# Patient Record
Sex: FEMALE | Race: BLACK OR AFRICAN AMERICAN | NOT HISPANIC OR LATINO | Employment: UNEMPLOYED | URBAN - METROPOLITAN AREA
[De-identification: names, ages, dates, MRNs, and addresses within clinical notes are randomized per-mention and may not be internally consistent; named-entity substitution may affect disease eponyms.]

---

## 2024-02-04 ENCOUNTER — HOSPITAL ENCOUNTER (EMERGENCY)
Facility: HOSPITAL | Age: 39
Discharge: HOME | End: 2024-02-05
Attending: EMERGENCY MEDICINE
Payer: COMMERCIAL

## 2024-02-04 DIAGNOSIS — R45.851 SUICIDAL IDEATION: ICD-10-CM

## 2024-02-04 DIAGNOSIS — F14.922: Primary | ICD-10-CM

## 2024-02-04 PROCEDURE — 99285 EMERGENCY DEPT VISIT HI MDM: CPT | Performed by: EMERGENCY MEDICINE

## 2024-02-04 PROCEDURE — 96374 THER/PROPH/DIAG INJ IV PUSH: CPT

## 2024-02-04 PROCEDURE — 81025 URINE PREGNANCY TEST: CPT

## 2024-02-04 PROCEDURE — 93010 ELECTROCARDIOGRAM REPORT: CPT | Performed by: EMERGENCY MEDICINE

## 2024-02-04 PROCEDURE — 99284 EMERGENCY DEPT VISIT MOD MDM: CPT | Mod: 25,27

## 2024-02-04 RX ORDER — ONDANSETRON HYDROCHLORIDE 2 MG/ML
4 INJECTION, SOLUTION INTRAVENOUS ONCE
Status: COMPLETED | OUTPATIENT
Start: 2024-02-05 | End: 2024-02-05

## 2024-02-04 RX ORDER — LORAZEPAM 0.5 MG/1
1 TABLET ORAL ONCE AS NEEDED
Status: COMPLETED | OUTPATIENT
Start: 2024-02-04 | End: 2024-02-05

## 2024-02-04 RX ORDER — LORAZEPAM 2 MG/ML
2 INJECTION INTRAMUSCULAR ONCE AS NEEDED
Status: DISCONTINUED | OUTPATIENT
Start: 2024-02-04 | End: 2024-02-05

## 2024-02-04 ASSESSMENT — COLUMBIA-SUICIDE SEVERITY RATING SCALE - C-SSRS
6. HAVE YOU EVER DONE ANYTHING, STARTED TO DO ANYTHING, OR PREPARED TO DO ANYTHING TO END YOUR LIFE?: YES
1. IN THE PAST MONTH, HAVE YOU WISHED YOU WERE DEAD OR WISHED YOU COULD GO TO SLEEP AND NOT WAKE UP?: NO
6. HAVE YOU EVER DONE ANYTHING, STARTED TO DO ANYTHING, OR PREPARED TO DO ANYTHING TO END YOUR LIFE?: NO
2. HAVE YOU ACTUALLY HAD ANY THOUGHTS OF KILLING YOURSELF?: NO

## 2024-02-05 ENCOUNTER — HOSPITAL ENCOUNTER (EMERGENCY)
Facility: HOSPITAL | Age: 39
Discharge: HOME | End: 2024-02-06
Attending: EMERGENCY MEDICINE
Payer: COMMERCIAL

## 2024-02-05 ENCOUNTER — CLINICAL SUPPORT (OUTPATIENT)
Dept: EMERGENCY MEDICINE | Facility: HOSPITAL | Age: 39
End: 2024-02-05
Payer: COMMERCIAL

## 2024-02-05 ENCOUNTER — APPOINTMENT (OUTPATIENT)
Dept: RADIOLOGY | Facility: HOSPITAL | Age: 39
End: 2024-02-05
Payer: COMMERCIAL

## 2024-02-05 VITALS
SYSTOLIC BLOOD PRESSURE: 113 MMHG | HEART RATE: 70 BPM | TEMPERATURE: 97.3 F | WEIGHT: 168 LBS | BODY MASS INDEX: 24.88 KG/M2 | DIASTOLIC BLOOD PRESSURE: 74 MMHG | OXYGEN SATURATION: 99 % | HEIGHT: 69 IN | RESPIRATION RATE: 18 BRPM

## 2024-02-05 DIAGNOSIS — R45.851 SUICIDAL IDEATION: Primary | ICD-10-CM

## 2024-02-05 LAB
ALBUMIN SERPL BCP-MCNC: 4.3 G/DL (ref 3.4–5)
ALP SERPL-CCNC: 69 U/L (ref 33–110)
ALT SERPL W P-5'-P-CCNC: 13 U/L (ref 7–45)
AMPHETAMINES UR QL SCN: ABNORMAL
ANION GAP SERPL CALC-SCNC: 14 MMOL/L (ref 10–20)
APAP SERPL-MCNC: <10 UG/ML
APPEARANCE UR: CLEAR
AST SERPL W P-5'-P-CCNC: 20 U/L (ref 9–39)
ATRIAL RATE: 69 BPM
BARBITURATES UR QL SCN: ABNORMAL
BENZODIAZ UR QL SCN: ABNORMAL
BILIRUB SERPL-MCNC: 0.6 MG/DL (ref 0–1.2)
BILIRUB UR STRIP.AUTO-MCNC: NEGATIVE MG/DL
BUN SERPL-MCNC: 6 MG/DL (ref 6–23)
BZE UR QL SCN: ABNORMAL
CALCIUM SERPL-MCNC: 9.8 MG/DL (ref 8.6–10.6)
CANNABINOIDS UR QL SCN: ABNORMAL
CARDIAC TROPONIN I PNL SERPL HS: 3 NG/L (ref 0–34)
CHLORIDE SERPL-SCNC: 101 MMOL/L (ref 98–107)
CO2 SERPL-SCNC: 21 MMOL/L (ref 21–32)
COLOR UR: YELLOW
CREAT SERPL-MCNC: 0.56 MG/DL (ref 0.5–1.05)
EGFRCR SERPLBLD CKD-EPI 2021: >90 ML/MIN/1.73M*2
ETHANOL SERPL-MCNC: 11 MG/DL
FENTANYL+NORFENTANYL UR QL SCN: ABNORMAL
FLUAV RNA RESP QL NAA+PROBE: NOT DETECTED
FLUBV RNA RESP QL NAA+PROBE: NOT DETECTED
GLUCOSE SERPL-MCNC: 83 MG/DL (ref 74–99)
GLUCOSE UR STRIP.AUTO-MCNC: NEGATIVE MG/DL
HOLD SPECIMEN: NORMAL
KETONES UR STRIP.AUTO-MCNC: NEGATIVE MG/DL
LEUKOCYTE ESTERASE UR QL STRIP.AUTO: NEGATIVE
LITHIUM SERPL-SCNC: 0.28 MMOL/L (ref 0.6–1.2)
NITRITE UR QL STRIP.AUTO: NEGATIVE
OPIATES UR QL SCN: ABNORMAL
OXYCODONE+OXYMORPHONE UR QL SCN: ABNORMAL
P AXIS: 73 DEGREES
P OFFSET: 202 MS
P ONSET: 147 MS
PCP UR QL SCN: ABNORMAL
PH UR STRIP.AUTO: 5 [PH]
POTASSIUM SERPL-SCNC: 4.2 MMOL/L (ref 3.5–5.3)
PR INTERVAL: 154 MS
PREGNANCY TEST URINE, POC: NEGATIVE
PROT SERPL-MCNC: 7.8 G/DL (ref 6.4–8.2)
PROT UR STRIP.AUTO-MCNC: NEGATIVE MG/DL
Q ONSET: 224 MS
QRS COUNT: 11 BEATS
QRS DURATION: 76 MS
QT INTERVAL: 400 MS
QTC CALCULATION(BAZETT): 428 MS
QTC FREDERICIA: 419 MS
R AXIS: 67 DEGREES
RBC # UR STRIP.AUTO: NEGATIVE /UL
SALICYLATES SERPL-MCNC: <3 MG/DL
SARS-COV-2 RNA RESP QL NAA+PROBE: NOT DETECTED
SODIUM SERPL-SCNC: 132 MMOL/L (ref 136–145)
SP GR UR STRIP.AUTO: 1.01
T AXIS: 58 DEGREES
T OFFSET: 424 MS
UROBILINOGEN UR STRIP.AUTO-MCNC: <2 MG/DL
VENTRICULAR RATE: 69 BPM

## 2024-02-05 PROCEDURE — 80053 COMPREHEN METABOLIC PANEL: CPT

## 2024-02-05 PROCEDURE — 80178 ASSAY OF LITHIUM: CPT

## 2024-02-05 PROCEDURE — 71045 X-RAY EXAM CHEST 1 VIEW: CPT | Performed by: STUDENT IN AN ORGANIZED HEALTH CARE EDUCATION/TRAINING PROGRAM

## 2024-02-05 PROCEDURE — 84484 ASSAY OF TROPONIN QUANT: CPT

## 2024-02-05 PROCEDURE — 87636 SARSCOV2 & INF A&B AMP PRB: CPT

## 2024-02-05 PROCEDURE — 36415 COLL VENOUS BLD VENIPUNCTURE: CPT

## 2024-02-05 PROCEDURE — 93005 ELECTROCARDIOGRAM TRACING: CPT

## 2024-02-05 PROCEDURE — 99285 EMERGENCY DEPT VISIT HI MDM: CPT | Mod: 25

## 2024-02-05 PROCEDURE — 2500000001 HC RX 250 WO HCPCS SELF ADMINISTERED DRUGS (ALT 637 FOR MEDICARE OP)

## 2024-02-05 PROCEDURE — 96374 THER/PROPH/DIAG INJ IV PUSH: CPT

## 2024-02-05 PROCEDURE — 71045 X-RAY EXAM CHEST 1 VIEW: CPT

## 2024-02-05 PROCEDURE — 80143 DRUG ASSAY ACETAMINOPHEN: CPT

## 2024-02-05 PROCEDURE — 2500000004 HC RX 250 GENERAL PHARMACY W/ HCPCS (ALT 636 FOR OP/ED)

## 2024-02-05 PROCEDURE — 80307 DRUG TEST PRSMV CHEM ANLYZR: CPT

## 2024-02-05 PROCEDURE — 81003 URINALYSIS AUTO W/O SCOPE: CPT | Mod: 59

## 2024-02-05 RX ORDER — MIDAZOLAM HYDROCHLORIDE 1 MG/ML
2 INJECTION INTRAMUSCULAR; INTRAVENOUS ONCE
Status: DISCONTINUED | OUTPATIENT
Start: 2024-02-05 | End: 2024-02-05 | Stop reason: HOSPADM

## 2024-02-05 RX ADMIN — LORAZEPAM 1 MG: 0.5 TABLET ORAL at 01:19

## 2024-02-05 RX ADMIN — ONDANSETRON 4 MG: 2 INJECTION INTRAMUSCULAR; INTRAVENOUS at 00:44

## 2024-02-05 SDOH — HEALTH STABILITY: MENTAL HEALTH: CONTENT: PREOCCUPATION

## 2024-02-05 SDOH — HEALTH STABILITY: MENTAL HEALTH: BEHAVIORS/MOOD: ANXIOUS;HALLUCINATIONS;TEARFUL

## 2024-02-05 SDOH — HEALTH STABILITY: MENTAL HEALTH: HALLUCINATION: VISUAL

## 2024-02-05 SDOH — HEALTH STABILITY: MENTAL HEALTH: NEEDS EXPRESSED: OTHER (COMMENT)

## 2024-02-05 SDOH — SOCIAL STABILITY: SOCIAL NETWORK: EMOTIONAL SUPPORT GIVEN: REASSURE

## 2024-02-05 ASSESSMENT — COLUMBIA-SUICIDE SEVERITY RATING SCALE - C-SSRS
4. HAVE YOU HAD THESE THOUGHTS AND HAD SOME INTENTION OF ACTING ON THEM?: YES
5. HAVE YOU STARTED TO WORK OUT OR WORKED OUT THE DETAILS OF HOW TO KILL YOURSELF? DO YOU INTEND TO CARRY OUT THIS PLAN?: NO
6. HAVE YOU EVER DONE ANYTHING, STARTED TO DO ANYTHING, OR PREPARED TO DO ANYTHING TO END YOUR LIFE?: NO
2. HAVE YOU ACTUALLY HAD ANY THOUGHTS OF KILLING YOURSELF?: YES
1. IN THE PAST MONTH, HAVE YOU WISHED YOU WERE DEAD OR WISHED YOU COULD GO TO SLEEP AND NOT WAKE UP?: YES
6. HAVE YOU EVER DONE ANYTHING, STARTED TO DO ANYTHING, OR PREPARED TO DO ANYTHING TO END YOUR LIFE?: YES

## 2024-02-05 ASSESSMENT — PAIN DESCRIPTION - PROGRESSION: CLINICAL_PROGRESSION: NOT CHANGED

## 2024-02-05 ASSESSMENT — PAIN SCALES - GENERAL: PAINLEVEL_OUTOF10: 0 - NO PAIN

## 2024-02-05 ASSESSMENT — PAIN - FUNCTIONAL ASSESSMENT: PAIN_FUNCTIONAL_ASSESSMENT: 0-10

## 2024-02-05 NOTE — PROGRESS NOTES
Helena Odonnell is a 38 y.o. female on day 0 of admission presenting with No Principal Problem: There is no principal problem currently on the Problem List. Please update the Problem List and refresh..      Assessment/Plan   Active Problems:  There are no active Hospital Problems.  SW consulted by magda Durán. She stated Pt seen and cleared by EPAT. Pt unable to utilize services from [facility] at this time. Luis Armando asked for support with connection to resources for food due to food insecurity.  SW met bedside with Pt. List provided SW composed for her zip code on resources for a warm meal, pantry, and mobile pantry in her area. Pt recognized [employer] stating she used them last week. SW explained how to use the list as Pt had difficulty reading how it was formatted. Pt to discharge home and is open to a ride to do so. She needs her belongings, however, to get dressed to discharge. Messaged RN for these items.   SW accompanied Pt to ride via roundtrip dispatch.  SERG Valdez

## 2024-02-05 NOTE — ED PROVIDER NOTES
HPI   Chief Complaint   Patient presents with    Hallucinations       38-year-old female past medical history of bipolar disorder as well as polysubstance use most recently cocaine and marijuana who presents today for hallucinations and suicidal ideation.  Patient states that she has previously been admitted to the hospital for psychiatric workup, was started on lithium quetiapine and Prozac.  She states that 2 days ago she took all of her lithium in an attempt to harm herself.  She states that since then she has had nausea vomiting diarrhea abdominal pain, and has had urinary frequency and dysuria as well.  She also endorses chest pain and shortness of breath.  She does also endorse cocaine use just prior to arrival.  She endorses suicidal ideation with active plan to overdose on her medications, similar to her previous attempt earlier this week.  She denies any fevers headache changes vision changes in hearing.  She denies any sore throat or cough.  She would like to be placed as she does not feel safe at home.                          Rosio Coma Scale Score: 14                  Patient History   No past medical history on file.  No past surgical history on file.  No family history on file.  Social History     Tobacco Use    Smoking status: Not on file    Smokeless tobacco: Not on file   Substance Use Topics    Alcohol use: Not on file    Drug use: Not on file       Physical Exam   ED Triage Vitals [02/04/24 2354]   Temperature Heart Rate Respirations BP   36.8 °C (98.2 °F) 72 18 (!) 149/98      Pulse Ox Temp Source Heart Rate Source Patient Position   100 % Oral Monitor Sitting      BP Location FiO2 (%)     Left arm 21 %       Physical Exam  Vitals and nursing note reviewed.   Constitutional:       General: She is not in acute distress.     Appearance: Normal appearance. She is not ill-appearing or diaphoretic.   HENT:      Head: Normocephalic and atraumatic.      Mouth/Throat:      Mouth: Mucous membranes are  moist.      Pharynx: No oropharyngeal exudate or posterior oropharyngeal erythema.   Eyes:      General: No scleral icterus.     Extraocular Movements: Extraocular movements intact.      Conjunctiva/sclera: Conjunctivae normal.      Pupils: Pupils are equal, round, and reactive to light.   Cardiovascular:      Rate and Rhythm: Normal rate and regular rhythm.      Pulses: Normal pulses.      Heart sounds: Normal heart sounds. No murmur heard.     No gallop.   Pulmonary:      Effort: Pulmonary effort is normal. No respiratory distress.      Breath sounds: Normal breath sounds. No stridor. No wheezing, rhonchi or rales.   Abdominal:      General: Bowel sounds are normal. There is no distension.      Palpations: Abdomen is soft. There is no mass.      Tenderness: There is no abdominal tenderness.      Hernia: No hernia is present.   Musculoskeletal:         General: No swelling, deformity or signs of injury. Normal range of motion.      Cervical back: Normal range of motion and neck supple. No tenderness.   Skin:     General: Skin is warm.      Capillary Refill: Capillary refill takes less than 2 seconds.      Findings: No erythema, lesion or rash.      Comments: Small abrasions on left wrist, no induration erythema or evidence of necrosis.   Neurological:      General: No focal deficit present.      Mental Status: She is alert. Mental status is at baseline.   Psychiatric:      Comments: Anxious mood, responding to internal stimuli.  Endorses SI with plan, no HI, (+) tactile versus visual hallucinations.         ED Course & MDM   Diagnoses as of 02/05/24 0053   Cocaine intoxication with perceptual disturbance (CMS/HCC)   Suicidal ideation       Medical Decision Making  38-year-old female past medical history of bipolar disorder currently on lithium quetiapine and fluoxetine as well as history of polysubstance use including cocaine who presents today for hallucinations and suicidal ideation.  Patient's tactile versus  visual hallucinations is likely due to cocaine intoxication, but given the fact that she has reported history of taking all of her lithium, we will get labs including a lithium level.  She did also endorse some chest pain and in the setting of cocaine use, is concerning for an adverse effect of cocaine use.  Given this, we will also get a troponin and BNP as well as a chest x-ray and EKG.  If the patient is medically cleared, we will have the EPAT evaluate her for possible placement.  I do anticipate the patient will need inpatient admission given her reported history.  Patient's lithium level was subtherapeutic, which is reassuring given her reported history of overdose.  Patient drug screen did demonstrate cocaine amphetamine PCP and marijuana, which may be the reason why she was having hallucinations and seem to be decompensated psychiatrically.  Given this, we will have EPAT evaluate her when she is sober for possible placement.  If EPAT does not feel that the patient needs placement for psychiatric reason, I do believe that she may benefit from [facility], if she is willing.  On reassessment, the patient is much more calm, but is still endorsing SI with plan.  She does endorse some substance use including PCP yesterday, which she does not prefer to do.  However, the patient still does not feel that she will be safe going home, so we will be EPAT to eval at time of signout.    Amount and/or Complexity of Data Reviewed  Labs: ordered. Decision-making details documented in ED Course.  Radiology: ordered.     Details: Chest x-ray without any acute abnormality, no consolidation pneumothorax or pleural effusion.  No bony abnormality or cardiomegaly.  ECG/medicine tests: ordered.        Procedure  Procedures     Luke Le MD  Resident  02/05/24 9491
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
## 2024-02-06 VITALS
OXYGEN SATURATION: 99 % | HEART RATE: 73 BPM | SYSTOLIC BLOOD PRESSURE: 139 MMHG | DIASTOLIC BLOOD PRESSURE: 78 MMHG | RESPIRATION RATE: 16 BRPM | TEMPERATURE: 97.9 F

## 2024-02-06 LAB
ALBUMIN SERPL BCP-MCNC: 4.5 G/DL (ref 3.4–5)
ALP SERPL-CCNC: 78 U/L (ref 33–110)
ALT SERPL W P-5'-P-CCNC: 14 U/L (ref 7–45)
AMPHETAMINES UR QL SCN: ABNORMAL
ANION GAP SERPL CALC-SCNC: 10 MMOL/L (ref 10–20)
APAP SERPL-MCNC: <10 UG/ML
AST SERPL W P-5'-P-CCNC: 19 U/L (ref 9–39)
B-HCG SERPL-ACNC: <3 MIU/ML
BARBITURATES UR QL SCN: ABNORMAL
BASOPHILS # BLD AUTO: 0.04 X10*3/UL (ref 0–0.1)
BASOPHILS NFR BLD AUTO: 0.4 %
BENZODIAZ UR QL SCN: ABNORMAL
BILIRUB SERPL-MCNC: 0.4 MG/DL (ref 0–1.2)
BUN SERPL-MCNC: 15 MG/DL (ref 6–23)
BZE UR QL SCN: ABNORMAL
CALCIUM SERPL-MCNC: 9.4 MG/DL (ref 8.6–10.6)
CANNABINOIDS UR QL SCN: ABNORMAL
CHLORIDE SERPL-SCNC: 104 MMOL/L (ref 98–107)
CO2 SERPL-SCNC: 26 MMOL/L (ref 21–32)
CREAT SERPL-MCNC: 0.74 MG/DL (ref 0.5–1.05)
EGFRCR SERPLBLD CKD-EPI 2021: >90 ML/MIN/1.73M*2
EOSINOPHIL # BLD AUTO: 0.05 X10*3/UL (ref 0–0.7)
EOSINOPHIL NFR BLD AUTO: 0.5 %
ERYTHROCYTE [DISTWIDTH] IN BLOOD BY AUTOMATED COUNT: 14.4 % (ref 11.5–14.5)
ETHANOL SERPL-MCNC: 39 MG/DL
FENTANYL+NORFENTANYL UR QL SCN: ABNORMAL
FLUAV RNA RESP QL NAA+PROBE: NOT DETECTED
FLUBV RNA RESP QL NAA+PROBE: NOT DETECTED
GLUCOSE SERPL-MCNC: 71 MG/DL (ref 74–99)
HCT VFR BLD AUTO: 36.1 % (ref 36–46)
HGB BLD-MCNC: 12.6 G/DL (ref 12–16)
IMM GRANULOCYTES # BLD AUTO: 0.03 X10*3/UL (ref 0–0.7)
IMM GRANULOCYTES NFR BLD AUTO: 0.3 % (ref 0–0.9)
LYMPHOCYTES # BLD AUTO: 2.1 X10*3/UL (ref 1.2–4.8)
LYMPHOCYTES NFR BLD AUTO: 22.5 %
MCH RBC QN AUTO: 30.8 PG (ref 26–34)
MCHC RBC AUTO-ENTMCNC: 34.9 G/DL (ref 32–36)
MCV RBC AUTO: 88 FL (ref 80–100)
MONOCYTES # BLD AUTO: 0.47 X10*3/UL (ref 0.1–1)
MONOCYTES NFR BLD AUTO: 5 %
NEUTROPHILS # BLD AUTO: 6.66 X10*3/UL (ref 1.2–7.7)
NEUTROPHILS NFR BLD AUTO: 71.3 %
NRBC BLD-RTO: 0 /100 WBCS (ref 0–0)
OPIATES UR QL SCN: ABNORMAL
OXYCODONE+OXYMORPHONE UR QL SCN: ABNORMAL
PCP UR QL SCN: ABNORMAL
PLATELET # BLD AUTO: 272 X10*3/UL (ref 150–450)
POTASSIUM SERPL-SCNC: 3.9 MMOL/L (ref 3.5–5.3)
PREGNANCY TEST URINE, POC: NEGATIVE
PROT SERPL-MCNC: 8.1 G/DL (ref 6.4–8.2)
RBC # BLD AUTO: 4.09 X10*6/UL (ref 4–5.2)
SALICYLATES SERPL-MCNC: <3 MG/DL
SARS-COV-2 RNA RESP QL NAA+PROBE: NOT DETECTED
SODIUM SERPL-SCNC: 136 MMOL/L (ref 136–145)
TSH SERPL-ACNC: 0.69 MIU/L (ref 0.44–3.98)
WBC # BLD AUTO: 9.4 X10*3/UL (ref 4.4–11.3)

## 2024-02-06 PROCEDURE — 99222 1ST HOSP IP/OBS MODERATE 55: CPT

## 2024-02-06 PROCEDURE — 80053 COMPREHEN METABOLIC PANEL: CPT | Performed by: STUDENT IN AN ORGANIZED HEALTH CARE EDUCATION/TRAINING PROGRAM

## 2024-02-06 PROCEDURE — 80143 DRUG ASSAY ACETAMINOPHEN: CPT | Performed by: STUDENT IN AN ORGANIZED HEALTH CARE EDUCATION/TRAINING PROGRAM

## 2024-02-06 PROCEDURE — 84702 CHORIONIC GONADOTROPIN TEST: CPT | Performed by: STUDENT IN AN ORGANIZED HEALTH CARE EDUCATION/TRAINING PROGRAM

## 2024-02-06 PROCEDURE — 80307 DRUG TEST PRSMV CHEM ANLYZR: CPT | Performed by: STUDENT IN AN ORGANIZED HEALTH CARE EDUCATION/TRAINING PROGRAM

## 2024-02-06 PROCEDURE — 85025 COMPLETE CBC W/AUTO DIFF WBC: CPT | Performed by: STUDENT IN AN ORGANIZED HEALTH CARE EDUCATION/TRAINING PROGRAM

## 2024-02-06 PROCEDURE — 84443 ASSAY THYROID STIM HORMONE: CPT | Performed by: STUDENT IN AN ORGANIZED HEALTH CARE EDUCATION/TRAINING PROGRAM

## 2024-02-06 PROCEDURE — 36415 COLL VENOUS BLD VENIPUNCTURE: CPT | Performed by: STUDENT IN AN ORGANIZED HEALTH CARE EDUCATION/TRAINING PROGRAM

## 2024-02-06 PROCEDURE — 81025 URINE PREGNANCY TEST: CPT | Performed by: STUDENT IN AN ORGANIZED HEALTH CARE EDUCATION/TRAINING PROGRAM

## 2024-02-06 PROCEDURE — 87636 SARSCOV2 & INF A&B AMP PRB: CPT | Performed by: STUDENT IN AN ORGANIZED HEALTH CARE EDUCATION/TRAINING PROGRAM

## 2024-02-06 RX ORDER — QUETIAPINE FUMARATE 400 MG/1
400 TABLET, FILM COATED ORAL NIGHTLY
COMMUNITY
Start: 2024-01-23

## 2024-02-06 RX ORDER — FLUOXETINE HYDROCHLORIDE 40 MG/1
40 CAPSULE ORAL DAILY
COMMUNITY
Start: 2024-01-23

## 2024-02-06 RX ORDER — LITHIUM CARBONATE 300 MG/1
600 TABLET, FILM COATED, EXTENDED RELEASE ORAL 2 TIMES DAILY
COMMUNITY
Start: 2024-01-23

## 2024-02-06 SDOH — HEALTH STABILITY: MENTAL HEALTH: HAVE YOU EVER DONE ANYTHING, STARTED TO DO ANYTHING, OR PREPARED TO DO ANYTHING TO END YOUR LIFE?: NO

## 2024-02-06 SDOH — HEALTH STABILITY: MENTAL HEALTH: HAVE YOU ACTUALLY HAD ANY THOUGHTS OF KILLING YOURSELF?: YES

## 2024-02-06 SDOH — HEALTH STABILITY: MENTAL HEALTH: HAVE YOU BEEN THINKING ABOUT HOW YOU MIGHT DO THIS?: NO

## 2024-02-06 SDOH — HEALTH STABILITY: MENTAL HEALTH: SUICIDE ASSESSMENT: ADULT (C-SSRS)

## 2024-02-06 SDOH — HEALTH STABILITY: MENTAL HEALTH: HALLUCINATION: AUDITORY;VISUAL;TACTILE

## 2024-02-06 SDOH — HEALTH STABILITY: MENTAL HEALTH: HAVE YOU HAD THESE THOUGHTS AND HAD SOME INTENTION OF ACTING ON THEM?: NO

## 2024-02-06 SDOH — HEALTH STABILITY: MENTAL HEALTH: BEHAVIORS/MOOD: CRYING;COOPERATIVE

## 2024-02-06 SDOH — HEALTH STABILITY: MENTAL HEALTH
HAVE YOU STARTED TO WORK OUT OR WORKED OUT THE DETAILS OF HOW TO KILL YOURSELF? DO YOU INTENT TO CARRY OUT THIS PLAN?: NO

## 2024-02-06 SDOH — HEALTH STABILITY: MENTAL HEALTH: HAVE YOU WISHED YOU WERE DEAD OR WISHED YOU COULD GO TO SLEEP AND NOT WAKE UP?: YES

## 2024-02-06 NOTE — PROGRESS NOTES
Patient signed to me at 0700.  Patient pending evaluation by emergency psychiatric assessment team.  Patient evaluated by emergency psychiatric assessment team and there are no acute concerns for SI/HI/AVH.  They note that the patient is agreeable and appropriate for drug rehabilitation.  [facility] team engaged and currently working on placement.  Plan at this time is to discharge patient drug rehabilitation.  Patient agreeable with plan agreeable discharge denying SI/HI/AVH and discharged in stable condition.    Patient seen and discussed with tending physician.    Gume Chris M.D.  PGY-3 EM

## 2024-02-06 NOTE — PROGRESS NOTES
"Helena Odonnell is a 38 y.o. female in ED for psychiatric evaluation      Assessment/Plan   Met with Pt bedside. Pt appeared fatigued.  SW discussed with Pt present living arrangement. She stated she is getting bitten by spiders in her home and she has communicated this to the medical team. Pt explained [facility] moved out of her building and [facility] is moving in.  SW spoke with Pt about expressing her concerns to them or contacting the  society.  SW asked Pt if she would like referral to [facility] today or to [facility]. Education provided to Pt on the [facility] per her request. Pt declined to be referral there. SW became agitated when SW explained her role as being there to assist Pt with a safe discharge. Pt began shouting at the SW, \"want me to bust my head on a bridge.\" Pt called SW a \"bitch\" as SW left a room.   GRACE collaborated with ANGELINE Nava who then assessed Pt and Pt agreed to [facility] referral. No further needs from this SWer.    SERG Valdez      "

## 2024-02-06 NOTE — ED PROVIDER NOTES
HPI:  Patient is a 38-year-old female with a history of bipolar disorder, polysubstance use disorder (methamphetamine and cocaine) hypertension who presents with suicidal ideation.  Of note, patient was discharged from here yesterday after being evaluated by EPAT for suicidal ideation.  She states she lives in a apartment building for women and has had frequent spider bites, stating that she no longer wants to live there.  Patient reports a history of suicide attempts in the past but does not specify further.  She reports persistent SI without a plan.  No tactile, visual or auditory hallucinations.  No HI.  No symptomatic complaints including but not limited to itching, rashes, nausea, vomiting, diarrhea, abdominal pain, chest pain or shortness of breath.    ROS: A 10-system ROS was performed and was negative except as documented in the HPI.    PMH/PSH: Reviewed in EMR. As above in HPI.  SH: Denies EtOH, tobacco or illicit drug use.  Allergies:   Allergies   Allergen Reactions    Sulfamethoxazole-Trimethoprim Hives and Other     headache      Medications: See prescription writer for full medication list.     General: no acute distress, appropriate conversation but very tearful  HEENT:  No rhinorrhea. MMM.  Cardiac: regular rate rhythm, no murmurs  Pulm:  normal respiratory effort on room air, equal chest expansion, clear bilaterally, no wheeze or crackles  GI: soft, nontender, nondistended, +BS  Extremities:  moves all extremities freely, no edema noted  Skin: warm, well-perfused, no lesions noted on exposed skin.  No insect bites noted to the arms and legs however there are diffuse facial excoriations noted.  No erythema.  Neuro:  AOx3, moves all 4 extremities freely and independently   Psych: Patient does not appear to be responding to internal stimuli.  Appropriate eye contact.    Assessment/Plan/MDM  Patient is a 38-year-old female with a history of bipolar disorder, polysubstance use disorder (methamphetamine  and cocaine) hypertension who presents with suicidal ideation.  Alcohol level slightly elevated, drug screen notable for cocaine and amphetamines.  COVID/flu swab negative.  Patient is not pregnant.  TSH within normal limits.  There are no gross electrolyte derangements, no leukocytosis or anemia.  Patient signed out in stable condition pending EPAT consultation.    EKG shows normal sinus rhythm, rate 76, normal axis, normal parables, normal QTc, no ST elevation, nonspecific PVCs, low voltage in V3 grossly unchanged from yesterday.    ED Course/Progress:    Diagnoses as of 02/06/24 0657   Suicidal ideation        Clinical Impression: as above  Dispo: Deferred to EPAT/Dr. Chris    Pt seen and discussed with attending physician, Dr. Devon Wynn MD  PGY3, Emergency Medicine    Disclaimer: This note was dictated by speech recognition. An attempt at proof reading was made to minimize errors. Errors in transcription may be present.  Please call if questions.      Joselin yWnn MD  Resident  02/06/24 5657

## 2024-02-06 NOTE — PROGRESS NOTES
Pharmacy Medication History Review    Helena Odonnell is a 38 y.o. female admitted for No Principal Problem: There is no principal problem currently on the Problem List. Please update the Problem List and refresh.. Pharmacy reviewed the patient's [id number] medications and allergies for accuracy.    The list below reflects the updated PTA list. Comments regarding how patient may be taking medications differently can be found in the Admit Orders Activity  Prior to Admission Medications   Prescriptions Last Dose Informant Patient Reported? Taking?   FLUoxetine (PROzac) 40 mg capsule  Self Yes Yes   Sig: Take 1 capsule (40 mg) by mouth once daily.   QUEtiapine (SEROquel) 400 mg tablet  Self Yes Yes   Sig: Take 1 tablet (400 mg) by mouth once daily at bedtime.   lithium ER (Lithobid) 300 mg 12 hr tablet  Self Yes Yes   Sig: Take 2 tablets (600 mg) by mouth 2 times a day.      Facility-Administered Medications: None        The list below reflects the updated allergy list. Please review each documented allergy for additional clarification and justification.  Allergies  Reviewed by Teresita Lundberg PharmD on 2/6/2024        Severity Reactions Comments    Sulfamethoxazole-trimethoprim Not Specified Hives, Other headache            Patient accepts M2B at discharge. Pharmacy has been updated to [facility].    Sources used to complete the med history include   Patient Interview - good historian able to independently state medications names and directions for administration  Admission MedRec Grid - none  OARRS - none  EPIC medication dispense report    Below are additional concerns with the patient's PTA list.  The patient's pharmacy dispense history is consistent with the medications the patient stated to be taking  Recent fill for famotidine 20mg once daily on 1/23/24, but patient denied taking this medication  Gets medications from [facility] only    Teresita Lundberg PharmD   Transitions of Care  Pharmacist   Meds Ambulatory and Retail Services  Please reach out via Secure Chat for questions, or if no response call [employer] or [facility]
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
## 2024-02-07 ENCOUNTER — HOSPITAL ENCOUNTER (EMERGENCY)
Facility: HOSPITAL | Age: 39
Discharge: HOME | End: 2024-02-07
Attending: EMERGENCY MEDICINE
Payer: COMMERCIAL

## 2024-02-07 VITALS
DIASTOLIC BLOOD PRESSURE: 63 MMHG | HEIGHT: 69 IN | WEIGHT: 168 LBS | TEMPERATURE: 97.3 F | BODY MASS INDEX: 24.88 KG/M2 | HEART RATE: 82 BPM | SYSTOLIC BLOOD PRESSURE: 99 MMHG | RESPIRATION RATE: 16 BRPM | OXYGEN SATURATION: 99 %

## 2024-02-07 DIAGNOSIS — F32.A DEPRESSION, UNSPECIFIED DEPRESSION TYPE: Primary | ICD-10-CM

## 2024-02-07 PROCEDURE — 99284 EMERGENCY DEPT VISIT MOD MDM: CPT | Performed by: EMERGENCY MEDICINE

## 2024-02-07 PROCEDURE — 99283 EMERGENCY DEPT VISIT LOW MDM: CPT | Performed by: EMERGENCY MEDICINE

## 2024-02-07 SDOH — HEALTH STABILITY: MENTAL HEALTH: NEEDS EXPRESSED: EMOTIONAL

## 2024-02-07 SDOH — SOCIAL STABILITY: SOCIAL NETWORK: EMOTIONAL SUPPORT GIVEN: REASSURE

## 2024-02-07 SDOH — HEALTH STABILITY: MENTAL HEALTH: BEHAVIORS/MOOD: ANXIOUS;CRYING;SAD;TEARFUL

## 2024-02-07 SDOH — HEALTH STABILITY: MENTAL HEALTH: HAVE YOU EVER DONE ANYTHING, STARTED TO DO ANYTHING, OR PREPARED TO DO ANYTHING TO END YOUR LIFE?: NO

## 2024-02-07 SDOH — HEALTH STABILITY: MENTAL HEALTH: HAVE YOU WISHED YOU WERE DEAD OR WISHED YOU COULD GO TO SLEEP AND NOT WAKE UP?: YES

## 2024-02-07 SDOH — HEALTH STABILITY: MENTAL HEALTH: SUICIDE ASSESSMENT: ADULT (C-SSRS)

## 2024-02-07 SDOH — HEALTH STABILITY: MENTAL HEALTH: HAVE YOU ACTUALLY HAD ANY THOUGHTS OF KILLING YOURSELF?: YES

## 2024-02-07 SDOH — HEALTH STABILITY: MENTAL HEALTH: HAVE YOU HAD THESE THOUGHTS AND HAD SOME INTENTION OF ACTING ON THEM?: NO

## 2024-02-07 SDOH — HEALTH STABILITY: MENTAL HEALTH: HAVE YOU BEEN THINKING ABOUT HOW YOU MIGHT DO THIS?: NO

## 2024-02-07 ASSESSMENT — COLUMBIA-SUICIDE SEVERITY RATING SCALE - C-SSRS
6. HAVE YOU EVER DONE ANYTHING, STARTED TO DO ANYTHING, OR PREPARED TO DO ANYTHING TO END YOUR LIFE?: NO
5. HAVE YOU STARTED TO WORK OUT OR WORKED OUT THE DETAILS OF HOW TO KILL YOURSELF? DO YOU INTEND TO CARRY OUT THIS PLAN?: NO
4. HAVE YOU HAD THESE THOUGHTS AND HAD SOME INTENTION OF ACTING ON THEM?: NO
6. HAVE YOU EVER DONE ANYTHING, STARTED TO DO ANYTHING, OR PREPARED TO DO ANYTHING TO END YOUR LIFE?: NO
1. IN THE PAST MONTH, HAVE YOU WISHED YOU WERE DEAD OR WISHED YOU COULD GO TO SLEEP AND NOT WAKE UP?: NO
2. HAVE YOU ACTUALLY HAD ANY THOUGHTS OF KILLING YOURSELF?: NO

## 2024-02-07 NOTE — ED PROVIDER NOTES
"HPI:  Patient is a 38-year-old female with a history of bipolar disorder, polysubstance use disorder, hypertension who presents with acute on chronic depression.  Patient states she is currently living in an independent women's apartment shelter with spiders.  She reports recurrent spider bites which prompted her to call EMS.  Patient states she is \"tired of being depressed\" but denies active SI or HI without a plan.  She denies tactile, visual or auditory hallucinations.  Of note, patient was discharged from here yesterday after being evaluated by [facility] for suicidal ideation.  She denies symptomatic complaints including but not limited to nausea, vomiting, diarrhea, chest pain or shortness of breath.    ROS: A 10-system ROS was performed and was negative except as documented in the HPI.    PMH/PSH: Reviewed in EMR. As above in HPI.  SH: Denies EtOH, tobacco or illicit drug use.  Allergies:   Allergies   Allergen Reactions    Sulfamethoxazole-Trimethoprim Hives and Other     headache      Medications: See prescription writer for full medication list.     General: no acute distress, appropriate conversation  HEENT:  No rhinorrhea. MMM.  Cardiac: regular rate rhythm, no murmurs  Pulm:  normal respiratory effort on room air, equal chest expansion, clear bilaterally, no wheeze or crackles  GI: soft, nontender, nondistended, +BS  Extremities:  moves all extremities freely, no edema noted  Skin: warm, well-perfused, no lesions noted on exposed skin.  No insect bites noted to the arms, legs or back.  No pustules, erythema, warmth or rash to exposed skin.  Neuro:  AOx3, moves all 4 extremities freely and independently     Assessment/Plan/MDM  Patient is a 38-year-old female with a history of bipolar disorder, polysubstance use disorder, hypertension who presents with acute on chronic depression.  Patient was evaluated by [facility] yesterday in addition to social work.  She was again evaluated 2 days prior.  She denies SI, HI, " tactile, visual or auditory hallucinations.  Patient did not meet inpatient psychiatric hospitalization criteria, as EPAT did not believe she would have any lasting resolution to her chronic social and circumstantial needs.  Patient is declining [facility] today and denies a history of substance use.  On reassessment with [facility] yesterday, they did not recommend any medication management changes.  I discussed this with the patient who acknowledged.  I do not feel that she is a danger to herself or others patient has previously had a social work consult for possible placement in a different but is declining this today.  Provided with food, passed p.o. challenge and ambulatory independently.  Patient provided with a ride back to her apartment shelter and discharged in stable condition.    ED Course/Progress:    Diagnoses as of 02/07/24 0608   Depression, unspecified depression type        Clinical Impression: as above  Dispo:   Home: I discussed the differential, results and discharge plan with the patient.  I emphasized the importance of follow-up with psychiatry as scheduled.  I explained reasons for the patient to return to the Emergency Department.  Questions were addressed.  They understand return precautions and discharge instructions. The patient expressed understanding and agreement with assessment/plan.     Pt seen and discussed with attending physician, Dr. Noe Wynn MD  PGY3, Emergency Medicine    Disclaimer: This note was dictated by speech recognition. An attempt at proof reading was made to minimize errors. Errors in transcription may be present.  Please call if questions.      Joselin Wynn MD  Resident  02/07/24 0611

## 2024-02-07 NOTE — ED TRIAGE NOTES
Patient presents to the ED for psych eval. Patient states she is being bit by spiders. Denies SI/HI

## 2024-02-20 ENCOUNTER — HOSPITAL ENCOUNTER (EMERGENCY)
Facility: HOSPITAL | Age: 39
Discharge: HOME | End: 2024-02-20
Attending: EMERGENCY MEDICINE
Payer: COMMERCIAL

## 2024-02-20 ENCOUNTER — CLINICAL SUPPORT (OUTPATIENT)
Dept: EMERGENCY MEDICINE | Facility: HOSPITAL | Age: 39
End: 2024-02-20
Payer: COMMERCIAL

## 2024-02-20 VITALS
OXYGEN SATURATION: 99 % | BODY MASS INDEX: 25.18 KG/M2 | SYSTOLIC BLOOD PRESSURE: 132 MMHG | HEART RATE: 80 BPM | DIASTOLIC BLOOD PRESSURE: 81 MMHG | HEIGHT: 69 IN | RESPIRATION RATE: 14 BRPM | TEMPERATURE: 98.6 F | WEIGHT: 170 LBS

## 2024-02-20 DIAGNOSIS — R45.851 SUICIDAL IDEATION: Primary | ICD-10-CM

## 2024-02-20 DIAGNOSIS — F14.90 CRACK COCAINE USE: ICD-10-CM

## 2024-02-20 LAB
ALBUMIN SERPL BCP-MCNC: 3.8 G/DL (ref 3.4–5)
ALP SERPL-CCNC: 57 U/L (ref 33–110)
ALT SERPL W P-5'-P-CCNC: 11 U/L (ref 7–45)
AMPHETAMINES UR QL SCN: ABNORMAL
ANION GAP SERPL CALC-SCNC: 13 MMOL/L (ref 10–20)
APAP SERPL-MCNC: <10 UG/ML
AST SERPL W P-5'-P-CCNC: 22 U/L (ref 9–39)
BARBITURATES UR QL SCN: ABNORMAL
BASOPHILS # BLD AUTO: 0.02 X10*3/UL (ref 0–0.1)
BASOPHILS NFR BLD AUTO: 0.3 %
BENZODIAZ UR QL SCN: ABNORMAL
BILIRUB SERPL-MCNC: 0.3 MG/DL (ref 0–1.2)
BUN SERPL-MCNC: 9 MG/DL (ref 6–23)
BZE UR QL SCN: ABNORMAL
CALCIUM SERPL-MCNC: 9.6 MG/DL (ref 8.6–10.6)
CANNABINOIDS UR QL SCN: ABNORMAL
CHLORIDE SERPL-SCNC: 103 MMOL/L (ref 98–107)
CO2 SERPL-SCNC: 28 MMOL/L (ref 21–32)
CREAT SERPL-MCNC: 0.59 MG/DL (ref 0.5–1.05)
EGFRCR SERPLBLD CKD-EPI 2021: >90 ML/MIN/1.73M*2
EOSINOPHIL # BLD AUTO: 0.01 X10*3/UL (ref 0–0.7)
EOSINOPHIL NFR BLD AUTO: 0.1 %
ERYTHROCYTE [DISTWIDTH] IN BLOOD BY AUTOMATED COUNT: 14.2 % (ref 11.5–14.5)
ETHANOL SERPL-MCNC: <10 MG/DL
FENTANYL+NORFENTANYL UR QL SCN: ABNORMAL
GLUCOSE SERPL-MCNC: 72 MG/DL (ref 74–99)
HCT VFR BLD AUTO: 31.8 % (ref 36–46)
HGB BLD-MCNC: 11.3 G/DL (ref 12–16)
IMM GRANULOCYTES # BLD AUTO: 0.01 X10*3/UL (ref 0–0.7)
IMM GRANULOCYTES NFR BLD AUTO: 0.1 % (ref 0–0.9)
LYMPHOCYTES # BLD AUTO: 1.67 X10*3/UL (ref 1.2–4.8)
LYMPHOCYTES NFR BLD AUTO: 23 %
MCH RBC QN AUTO: 31.3 PG (ref 26–34)
MCHC RBC AUTO-ENTMCNC: 35.5 G/DL (ref 32–36)
MCV RBC AUTO: 88 FL (ref 80–100)
MONOCYTES # BLD AUTO: 0.48 X10*3/UL (ref 0.1–1)
MONOCYTES NFR BLD AUTO: 6.6 %
NEUTROPHILS # BLD AUTO: 5.06 X10*3/UL (ref 1.2–7.7)
NEUTROPHILS NFR BLD AUTO: 69.9 %
NRBC BLD-RTO: 0 /100 WBCS (ref 0–0)
OPIATES UR QL SCN: ABNORMAL
OXYCODONE+OXYMORPHONE UR QL SCN: ABNORMAL
PCP UR QL SCN: ABNORMAL
PLATELET # BLD AUTO: 236 X10*3/UL (ref 150–450)
POTASSIUM SERPL-SCNC: 4 MMOL/L (ref 3.5–5.3)
PREGNANCY TEST URINE, POC: NEGATIVE
PROT SERPL-MCNC: 7.1 G/DL (ref 6.4–8.2)
RBC # BLD AUTO: 3.61 X10*6/UL (ref 4–5.2)
SALICYLATES SERPL-MCNC: <3 MG/DL
SARS-COV-2 RNA RESP QL NAA+PROBE: NOT DETECTED
SODIUM SERPL-SCNC: 140 MMOL/L (ref 136–145)
WBC # BLD AUTO: 7.3 X10*3/UL (ref 4.4–11.3)

## 2024-02-20 PROCEDURE — 99285 EMERGENCY DEPT VISIT HI MDM: CPT | Performed by: EMERGENCY MEDICINE

## 2024-02-20 PROCEDURE — 87635 SARS-COV-2 COVID-19 AMP PRB: CPT | Performed by: EMERGENCY MEDICINE

## 2024-02-20 PROCEDURE — 80307 DRUG TEST PRSMV CHEM ANLYZR: CPT | Performed by: STUDENT IN AN ORGANIZED HEALTH CARE EDUCATION/TRAINING PROGRAM

## 2024-02-20 PROCEDURE — 99285 EMERGENCY DEPT VISIT HI MDM: CPT | Mod: 25

## 2024-02-20 PROCEDURE — 80320 DRUG SCREEN QUANTALCOHOLS: CPT | Performed by: STUDENT IN AN ORGANIZED HEALTH CARE EDUCATION/TRAINING PROGRAM

## 2024-02-20 PROCEDURE — 93005 ELECTROCARDIOGRAM TRACING: CPT

## 2024-02-20 PROCEDURE — 85025 COMPLETE CBC W/AUTO DIFF WBC: CPT | Performed by: STUDENT IN AN ORGANIZED HEALTH CARE EDUCATION/TRAINING PROGRAM

## 2024-02-20 PROCEDURE — 93010 ELECTROCARDIOGRAM REPORT: CPT | Performed by: EMERGENCY MEDICINE

## 2024-02-20 PROCEDURE — 99222 1ST HOSP IP/OBS MODERATE 55: CPT

## 2024-02-20 PROCEDURE — 80053 COMPREHEN METABOLIC PANEL: CPT | Performed by: STUDENT IN AN ORGANIZED HEALTH CARE EDUCATION/TRAINING PROGRAM

## 2024-02-20 PROCEDURE — 36415 COLL VENOUS BLD VENIPUNCTURE: CPT | Performed by: STUDENT IN AN ORGANIZED HEALTH CARE EDUCATION/TRAINING PROGRAM

## 2024-02-20 PROCEDURE — 81025 URINE PREGNANCY TEST: CPT | Performed by: STUDENT IN AN ORGANIZED HEALTH CARE EDUCATION/TRAINING PROGRAM

## 2024-02-20 ASSESSMENT — COLUMBIA-SUICIDE SEVERITY RATING SCALE - C-SSRS
6. HAVE YOU EVER DONE ANYTHING, STARTED TO DO ANYTHING, OR PREPARED TO DO ANYTHING TO END YOUR LIFE?: NO
1. IN THE PAST MONTH, HAVE YOU WISHED YOU WERE DEAD OR WISHED YOU COULD GO TO SLEEP AND NOT WAKE UP?: YES
2. HAVE YOU ACTUALLY HAD ANY THOUGHTS OF KILLING YOURSELF?: NO

## 2024-02-20 ASSESSMENT — PAIN - FUNCTIONAL ASSESSMENT: PAIN_FUNCTIONAL_ASSESSMENT: 0-10

## 2024-02-20 ASSESSMENT — PAIN SCALES - GENERAL
PAINLEVEL_OUTOF10: 0 - NO PAIN
PAINLEVEL_OUTOF10: 0 - NO PAIN

## 2024-02-20 NOTE — ED TRIAGE NOTES
Pt seen here yesterday for same. Pt was discharged to home with script. Pt unable to . Is still feeling depressed. Has admitted to alcohol usage today. A/Ox4 w/o cp sobb or n/v. 0/10 pain. Skin warm dry and intact pink membranes. Pt recalls all events and follows all commands. Resp are equal and unlabored. MAEx4 w/o neuro defs noted.

## 2024-02-20 NOTE — ED PROVIDER NOTES
HPI   Chief Complaint   Patient presents with    Suicidal       The patient is a 38-year-old female with past medical history of polysubstance abuse and schizophrenia presenting to ED for suicidal ideation and concerns of polysubstance abuse. The patient reports she feels like she is out of control with her polysubstance abuse, and it is driving her to have thoughts of hurting herself without any specific plan. She does report passive suicidal ideation. She denies any homicidal ideation. No auditory or visual hallucinations.  She did admit to using alcohol and crack cocaine several hours ago.  She also reports that she was seen at outside hospital ED and offered some form of detox which she declined yesterday. She denies any trauma, falls, or injuries. No passing out. She denies any headache, dizziness, vision changes, neck pain, back pain, chest pain, shortness of breath, nausea, vomiting, abdominal pain, diarrhea, urinary symptoms, numbness, tingling, fevers, or chills.                           [place] Coma Scale Score: 15                     Patient History   No past medical history on file.  No past surgical history on file.  No family history on file.  Social History     Tobacco Use    Smoking status: Unknown    Smokeless tobacco: Not on file   Substance Use Topics    Alcohol use: Not on file    Drug use: Not on file       Physical Exam   ED Triage Vitals [02/20/24 0538]   Temperature Heart Rate Respirations BP   36.5 °C (97.7 °F) 96 19 (!) 145/97      Pulse Ox Temp src Heart Rate Source Patient Position   100 % -- -- --      BP Location FiO2 (%)     -- --       Physical Exam  Constitutional:       General: She is not in acute distress.     Appearance: Normal appearance.   HENT:      Head: Normocephalic and atraumatic.      Mouth/Throat:      Mouth: Mucous membranes are moist.   Eyes:      General: No scleral icterus.     Extraocular Movements: Extraocular movements intact.      Pupils: Pupils are equal,  round, and reactive to light.   Cardiovascular:      Rate and Rhythm: Normal rate and regular rhythm.      Heart sounds: Normal heart sounds. No murmur heard.  Pulmonary:      Effort: Pulmonary effort is normal. No respiratory distress.      Breath sounds: Normal breath sounds. No wheezing.   Abdominal:      General: There is no distension.      Palpations: Abdomen is soft.      Tenderness: There is no abdominal tenderness. There is no guarding or rebound.   Musculoskeletal:         General: Normal range of motion.      Cervical back: Normal range of motion and neck supple.      Right lower leg: No edema.      Left lower leg: No edema.   Skin:     General: Skin is warm and dry.   Neurological:      General: No focal deficit present.      Mental Status: She is alert and oriented to person, place, and time.      Cranial Nerves: No cranial nerve deficit.      Sensory: No sensory deficit.      Motor: No weakness.   Psychiatric:      Comments: Reports suicidal ideation but denies any specific plan. Denies homicidal ideation. Denies auditory or visual hallucinations.          ED Course & [facility]   ED Course as of 02/20/24 2146   Tue Feb 20, 2024 0737 EKG interpreted by myself: Normal sinus rhythm, ventricular 68, normal axis, QTc 420 ms, no acute injury pattern noted. [VH]   1255 Briefly, patient is a 38-year-old female with history of polysubstance use (alcohol and crack cocaine) last use 2 hours prior to arrival who presented with suicidal ideation without a plan.  Patient evaluated by [facility], does not meet inpatient psych criteria.  Patient declining [facility] consultation.  Discharged in stable condition with psychiatric resources [EG]      ED Course User Index  [EG] Joselin Wynn MD  [VH] Shyam Randall DO         Diagnoses as of 02/20/24 2146   Suicidal ideation   Crack cocaine use       Medical Decision Making  In the Emergency Department, hospital records were reviewed.  The patient is afebrile with stable vital signs.  The patient is in no respiratory distress, satting well on room air. The patient presents for suicidal ideation with known polysubstance abuse.  She did report alcohol use and crack cocaine use today. She denies any specific plan to hurt herself. She denies any homicidal ideation. She denies any hallucinations. Elopement precautions and suicide precautions were ordered.    Will obtain medical clearance labs and have EPAT evaluate the patient. Labs were sent with the results pending. The patient is signed out to the oncoming resident physician pending labs and pending EPAT evaluation and recommendations for final disposition.  Given that she does have housing insecurity and substance abuse affecting her social determinants of health, she will likely also require social work consultation upon discharge, whether that happens today in the ED or after inpatient psychiatric hospitalization. Patient remains stable at this time.    Discussed with the attending  Shyam Randall DO PGY-4  Emergency Medicine    Patient        Procedure  Procedures     Shyam Randall DO  Resident  02/20/24 3964    I saw and evaluated the patient. I personally obtained the key and critical portions of the history and physical exam or was physically present for key and critical portions performed by the resident/fellow. I reviewed the resident/fellow's documentation and discussed the patient with the resident/fellow. I agree with the resident/fellow's medical decision making as documented in the note.    MD Shannan Tello MD  02/21/24 2023
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
## 2024-03-14 ENCOUNTER — APPOINTMENT (OUTPATIENT)
Dept: RADIOLOGY | Facility: HOSPITAL | Age: 39
End: 2024-03-14
Payer: COMMERCIAL

## 2024-03-14 ENCOUNTER — HOSPITAL ENCOUNTER (EMERGENCY)
Facility: HOSPITAL | Age: 39
Discharge: HOME | End: 2024-03-14
Attending: EMERGENCY MEDICINE
Payer: COMMERCIAL

## 2024-03-14 ENCOUNTER — CLINICAL SUPPORT (OUTPATIENT)
Dept: EMERGENCY MEDICINE | Facility: HOSPITAL | Age: 39
End: 2024-03-14
Payer: COMMERCIAL

## 2024-03-14 VITALS
WEIGHT: 201 LBS | BODY MASS INDEX: 29.77 KG/M2 | HEIGHT: 69 IN | RESPIRATION RATE: 16 BRPM | HEART RATE: 81 BPM | SYSTOLIC BLOOD PRESSURE: 145 MMHG | TEMPERATURE: 97.5 F | DIASTOLIC BLOOD PRESSURE: 84 MMHG | OXYGEN SATURATION: 100 %

## 2024-03-14 DIAGNOSIS — R07.9 CHEST PAIN, UNSPECIFIED TYPE: Primary | ICD-10-CM

## 2024-03-14 LAB
ALBUMIN SERPL BCP-MCNC: 4.2 G/DL (ref 3.4–5)
ALP SERPL-CCNC: 57 U/L (ref 33–110)
ALT SERPL W P-5'-P-CCNC: 14 U/L (ref 7–45)
AMPHETAMINES UR QL SCN: ABNORMAL
ANION GAP SERPL CALC-SCNC: 12 MMOL/L (ref 10–20)
AST SERPL W P-5'-P-CCNC: 21 U/L (ref 9–39)
B-HCG SERPL-ACNC: <3 MIU/ML
BARBITURATES UR QL SCN: ABNORMAL
BASOPHILS # BLD AUTO: 0.03 X10*3/UL (ref 0–0.1)
BASOPHILS NFR BLD AUTO: 0.4 %
BENZODIAZ UR QL SCN: ABNORMAL
BILIRUB SERPL-MCNC: 0.2 MG/DL (ref 0–1.2)
BUN SERPL-MCNC: 10 MG/DL (ref 6–23)
BZE UR QL SCN: ABNORMAL
CALCIUM SERPL-MCNC: 9.2 MG/DL (ref 8.6–10.6)
CANNABINOIDS UR QL SCN: ABNORMAL
CARDIAC TROPONIN I PNL SERPL HS: <3 NG/L (ref 0–34)
CHLORIDE SERPL-SCNC: 99 MMOL/L (ref 98–107)
CO2 SERPL-SCNC: 29 MMOL/L (ref 21–32)
CREAT SERPL-MCNC: 0.59 MG/DL (ref 0.5–1.05)
D DIMER PPP FEU-MCNC: 292 NG/ML FEU
EGFRCR SERPLBLD CKD-EPI 2021: >90 ML/MIN/1.73M*2
EOSINOPHIL # BLD AUTO: 0.01 X10*3/UL (ref 0–0.7)
EOSINOPHIL NFR BLD AUTO: 0.1 %
ERYTHROCYTE [DISTWIDTH] IN BLOOD BY AUTOMATED COUNT: 13.5 % (ref 11.5–14.5)
FENTANYL+NORFENTANYL UR QL SCN: ABNORMAL
FLUAV RNA RESP QL NAA+PROBE: NOT DETECTED
FLUBV RNA RESP QL NAA+PROBE: NOT DETECTED
GLUCOSE SERPL-MCNC: 73 MG/DL (ref 74–99)
HCT VFR BLD AUTO: 33 % (ref 36–46)
HGB BLD-MCNC: 11.1 G/DL (ref 12–16)
IMM GRANULOCYTES # BLD AUTO: 0.02 X10*3/UL (ref 0–0.7)
IMM GRANULOCYTES NFR BLD AUTO: 0.3 % (ref 0–0.9)
LYMPHOCYTES # BLD AUTO: 1.59 X10*3/UL (ref 1.2–4.8)
LYMPHOCYTES NFR BLD AUTO: 21.1 %
MCH RBC QN AUTO: 29.9 PG (ref 26–34)
MCHC RBC AUTO-ENTMCNC: 33.6 G/DL (ref 32–36)
MCV RBC AUTO: 89 FL (ref 80–100)
METHADONE UR QL SCN: ABNORMAL
MONOCYTES # BLD AUTO: 0.52 X10*3/UL (ref 0.1–1)
MONOCYTES NFR BLD AUTO: 6.9 %
NEUTROPHILS # BLD AUTO: 5.35 X10*3/UL (ref 1.2–7.7)
NEUTROPHILS NFR BLD AUTO: 71.2 %
NRBC BLD-RTO: 0 /100 WBCS (ref 0–0)
OPIATES UR QL SCN: ABNORMAL
OXYCODONE+OXYMORPHONE UR QL SCN: ABNORMAL
PCP UR QL SCN: ABNORMAL
PLATELET # BLD AUTO: 285 X10*3/UL (ref 150–450)
POTASSIUM SERPL-SCNC: 4.6 MMOL/L (ref 3.5–5.3)
PROT SERPL-MCNC: 7.8 G/DL (ref 6.4–8.2)
RBC # BLD AUTO: 3.71 X10*6/UL (ref 4–5.2)
SARS-COV-2 RNA RESP QL NAA+PROBE: NOT DETECTED
SODIUM SERPL-SCNC: 135 MMOL/L (ref 136–145)
TSH SERPL-ACNC: 2.46 MIU/L (ref 0.44–3.98)
WBC # BLD AUTO: 7.5 X10*3/UL (ref 4.4–11.3)

## 2024-03-14 PROCEDURE — 84702 CHORIONIC GONADOTROPIN TEST: CPT | Performed by: STUDENT IN AN ORGANIZED HEALTH CARE EDUCATION/TRAINING PROGRAM

## 2024-03-14 PROCEDURE — 73630 X-RAY EXAM OF FOOT: CPT | Mod: LEFT SIDE | Performed by: RADIOLOGY

## 2024-03-14 PROCEDURE — 99285 EMERGENCY DEPT VISIT HI MDM: CPT | Performed by: EMERGENCY MEDICINE

## 2024-03-14 PROCEDURE — 71046 X-RAY EXAM CHEST 2 VIEWS: CPT | Performed by: RADIOLOGY

## 2024-03-14 PROCEDURE — 73630 X-RAY EXAM OF FOOT: CPT | Mod: RT

## 2024-03-14 PROCEDURE — 85025 COMPLETE CBC W/AUTO DIFF WBC: CPT | Performed by: EMERGENCY MEDICINE

## 2024-03-14 PROCEDURE — 80307 DRUG TEST PRSMV CHEM ANLYZR: CPT | Performed by: EMERGENCY MEDICINE

## 2024-03-14 PROCEDURE — 84484 ASSAY OF TROPONIN QUANT: CPT | Performed by: STUDENT IN AN ORGANIZED HEALTH CARE EDUCATION/TRAINING PROGRAM

## 2024-03-14 PROCEDURE — 36415 COLL VENOUS BLD VENIPUNCTURE: CPT | Performed by: STUDENT IN AN ORGANIZED HEALTH CARE EDUCATION/TRAINING PROGRAM

## 2024-03-14 PROCEDURE — 71046 X-RAY EXAM CHEST 2 VIEWS: CPT

## 2024-03-14 PROCEDURE — 93005 ELECTROCARDIOGRAM TRACING: CPT

## 2024-03-14 PROCEDURE — 73630 X-RAY EXAM OF FOOT: CPT | Mod: LT

## 2024-03-14 PROCEDURE — 87636 SARSCOV2 & INF A&B AMP PRB: CPT | Performed by: STUDENT IN AN ORGANIZED HEALTH CARE EDUCATION/TRAINING PROGRAM

## 2024-03-14 PROCEDURE — 80053 COMPREHEN METABOLIC PANEL: CPT | Performed by: EMERGENCY MEDICINE

## 2024-03-14 PROCEDURE — 99284 EMERGENCY DEPT VISIT MOD MDM: CPT | Mod: 25 | Performed by: EMERGENCY MEDICINE

## 2024-03-14 PROCEDURE — 36415 COLL VENOUS BLD VENIPUNCTURE: CPT | Performed by: EMERGENCY MEDICINE

## 2024-03-14 PROCEDURE — 85379 FIBRIN DEGRADATION QUANT: CPT | Performed by: STUDENT IN AN ORGANIZED HEALTH CARE EDUCATION/TRAINING PROGRAM

## 2024-03-14 PROCEDURE — 84443 ASSAY THYROID STIM HORMONE: CPT | Performed by: STUDENT IN AN ORGANIZED HEALTH CARE EDUCATION/TRAINING PROGRAM

## 2024-03-14 RX ORDER — ACETAMINOPHEN 325 MG/1
650 TABLET ORAL ONCE
Status: COMPLETED | OUTPATIENT
Start: 2024-03-14 | End: 2024-03-14

## 2024-03-14 RX ADMIN — ACETAMINOPHEN 650 MG: 325 TABLET ORAL at 06:41

## 2024-03-14 ASSESSMENT — COLUMBIA-SUICIDE SEVERITY RATING SCALE - C-SSRS
6. HAVE YOU EVER DONE ANYTHING, STARTED TO DO ANYTHING, OR PREPARED TO DO ANYTHING TO END YOUR LIFE?: NO
5. HAVE YOU STARTED TO WORK OUT OR WORKED OUT THE DETAILS OF HOW TO KILL YOURSELF? DO YOU INTEND TO CARRY OUT THIS PLAN?: YES
4. HAVE YOU HAD THESE THOUGHTS AND HAD SOME INTENTION OF ACTING ON THEM?: YES
1. IN THE PAST MONTH, HAVE YOU WISHED YOU WERE DEAD OR WISHED YOU COULD GO TO SLEEP AND NOT WAKE UP?: YES
6. HAVE YOU EVER DONE ANYTHING, STARTED TO DO ANYTHING, OR PREPARED TO DO ANYTHING TO END YOUR LIFE?: YES
1. IN THE PAST MONTH, HAVE YOU WISHED YOU WERE DEAD OR WISHED YOU COULD GO TO SLEEP AND NOT WAKE UP?: NO
2. HAVE YOU ACTUALLY HAD ANY THOUGHTS OF KILLING YOURSELF?: YES
2. HAVE YOU ACTUALLY HAD ANY THOUGHTS OF KILLING YOURSELF?: NO
6. HAVE YOU EVER DONE ANYTHING, STARTED TO DO ANYTHING, OR PREPARED TO DO ANYTHING TO END YOUR LIFE?: YES

## 2024-03-14 ASSESSMENT — LIFESTYLE VARIABLES
HAVE YOU EVER FELT YOU SHOULD CUT DOWN ON YOUR DRINKING: NO
EVER HAD A DRINK FIRST THING IN THE MORNING TO STEADY YOUR NERVES TO GET RID OF A HANGOVER: NO
EVER FELT BAD OR GUILTY ABOUT YOUR DRINKING: NO
HAVE PEOPLE ANNOYED YOU BY CRITICIZING YOUR DRINKING: NO

## 2024-03-14 ASSESSMENT — PAIN - FUNCTIONAL ASSESSMENT: PAIN_FUNCTIONAL_ASSESSMENT: 0-10

## 2024-03-14 ASSESSMENT — PAIN DESCRIPTION - LOCATION: LOCATION: CHEST

## 2024-03-14 ASSESSMENT — PAIN SCALES - GENERAL: PAINLEVEL_OUTOF10: 8

## 2024-03-14 NOTE — ED PROVIDER NOTES
HPI:  Helena Odonnell is a 38 y.o. with a history of depression, BPD, PTSD, prior blood clot on AC, presents to the ED c/o chest pain and suicidal ideation.  She endorses that she was just discharged from psychiatric inpatient stay yesterday, has been feeling tearful, does not feel back to her baseline.  Is endorsing that she used crack cocaine and began having chest pain afterwards.  Does endorse that she has a history of blood clots in the past, is also complaining of body aches, productive yellow cough without any fevers.  Denies any nausea, vomiting, abdominal pain.  Describes the pain in the center of her chest, worse with coughing.  Has not taken any medication prior to arrival for her pain.  States that she has been having pain in her feet which has been chronic, denies any recent trauma or injuries.    .     Limitations to History: No limitations      ------------------------------------------------------------------------------------------------------------------------------------------    Physical Exam:    ED Triage Vitals [03/14/24 0402]   Temperature Heart Rate Respirations BP   36.4 °C (97.5 °F) 94 16 (!) 144/96      Pulse Ox Temp Source Heart Rate Source Patient Position   100 % Temporal Monitor --      BP Location FiO2 (%)     -- 21 %         Gen: Alert, NAD.  Laying in bed, nontoxic.  Vital signs on arrival are stable.  Head/Neck: NCAT, neck w/ FROM  Eyes: EOMI, PERRL, anicteric sclerae, noninjected conjunctivae  Nose: Nares patent w/o rhinorrhea  Mouth:  MMM, no OP lesions noted  Heart: RRR, well perfused.  No anterior chest wall tenderness to palpation.  Lungs: CTA b/l no RRW, no increased work of breathing  Abdomen: soft, NT, ND, no rebound guarding or rigidity  Extremities: Warm, well perfused. Compartments soft, minimal tenderness to palpation along first metatarsal bilateral feet without any overlying skin changes.  No erythema, 2+ distal pulses, sensation and strength intact and  equal.  Neurologic: Alert, symmetrical facies, phonates clearly, moves all extremities equally, responsive to touch, ambulates normally   Skin: warm, dry   Psychological: calm, intermittently tearful.  Does endorse SI, denies HI, AVH.    ------------------------------------------------------------------------------------------------------------------------------------------    Medical Decision Making  38-year-old female with past medical history of prior blood clot on AC, depression, bipolar, PTSD, presents to the emergency department with multiple complaints including chest pain and endorses suicidal ideation.  Recently discharged from psychiatric hospital stay and tearful on exam, without plan or attempt prior to arrival.  Vital signs on arrival are stable, patient is nontoxic.  Exam is as above.  She has been compliant with her anticoagulation, and is complaining of chest pain we will obtain a dimer, troponins, EKG, viral swabs.  Chest x-ray and bilateral foot x-ray showed no acute pathology, questionable navicular injury with age-indeterminate fracture.  She has no correlating point tenderness on examination.  EKG is nonischemic.  Labs are pending at this time signout to incoming team.      ED Course as of 03/16/24 0952   Thu Mar 14, 2024   0809 D-dimer, VTE Exclusion  Dimer is negative [SH]   0810 Human Chorionic Gonadotropin, Serum Quantitative  hCG is negative [SH]   0810 TSH with reflex to Free T4 if abnormal  TSH is normal [SH]   0845 Sars-CoV-2 and Influenza A/B PCR  COVID and flu negative [SH]   1135 Troponin I, High Sensitivity  Troponin is normal [SH]      ED Course User Index  [SH] Mitch Hendrix MD         Diagnoses as of 03/16/24 0952   Chest pain, unspecified type        Procedures    EKG, interpreted by me, shows normal sinus rhythm at 90 bpm, normal axis, no ST elevations or depressions, intervals are within normal limits.  No STEMI.    Clinical Impression: chest pain, SI     Dispo: pending upon  signout to incoming team      Discussed with ED Attending, Dr. Ty.        This note was dictated with Voice Recognition software, please excuse any dictation errors.     Karthikeyan Basilio DO   Emergency Medicine, PGY3      Karthikeyan Basilio DO  Resident  03/16/24 1849

## 2024-03-14 NOTE — ED TRIAGE NOTES
Pt states that she recently used crack cocaine and now she is having chest pain and she does not feel right. Pt also states that she is suicidal.

## 2024-03-14 NOTE — PROGRESS NOTES
Signout Plan   I received Helena Odonnell in signout from The previous provider.  Please see the ED Provider Note for all HPI, PE and MDM up to the time of signout.  This is in addition to the primary record.    In brief Helena Odonnell is an 38 y.o. female presenting for Chest Pain       At the time of signout we were awaiting:  Completion of laboratory workup, and potential evaluation by EPAT given patient presented with concern for SI    ED Course and Medical Decision Making   ED Course:  ED Course as of 03/14/24 1137   Thu Mar 14, 2024   0809 D-dimer, VTE Exclusion  Dimer is negative [SH]   0810 Human Chorionic Gonadotropin, Serum Quantitative  hCG is negative [SH]   0810 TSH with reflex to Free T4 if abnormal  TSH is normal [SH]   0845 Sars-CoV-2 and Influenza A/B PCR  COVID and flu negative [SH]   1135 Troponin I, High Sensitivity  Troponin is normal [SH]      ED Course User Index  [SH] Mitch Hendrix MD         Diagnoses as of 03/14/24 1137   Chest pain, unspecified type       MDM:  Under my care, patient remained stable.  Her laboratory evaluation was unremarkable.  X-ray did show age-indeterminate fracture of the navicular bone, but on my assessment in the previous provider's assessment, she has no point tenderness over the navicular bone, she is ambulating without any ataxia or pain.    On my reassessment, she states she no longer feels suicidal, and never felt suicidal last night.  She was recently discharged from inpatient psych, she has outpatient resources, has appointment scheduled for next week.  She has all of her necessary medications prescribed to her at the time of discharge from psychiatry yesterday.  She does not appear to be possible threat to herself or others at this time.  She denies current SI, HI, AH, VH.  Denies any plans to commit suicide.  Seems safe for discharge at this time.  Discussed outpatient follow-up with her.  Discussed return precautions.  Discharged in stable  condition.    Disposition   As a result of the work-up, patient was discharged home.  They were informed of their diagnosis and instructed to come back with any concerns or worsening of condition and was agreeable to the plan as discussed above.  The patient was given the opportunity to ask questions.  All of the patient's questions were answered.  The patient remained stable under my care.    Procedures   Procedures    Patient discussed with ED attending physician.    Kel Hendrix MD  PGY 3 Emergency Medicine

## 2024-03-14 NOTE — PROGRESS NOTES
Sw met with pt bedside following a consult for clothing. Pt reports she needs new clothing including shoes for DC. Pt reports does not have any clean clothes and her shoes do not fit any longer. Sw advised pt we do not have any shoes, but was able to give pt new pants and two shirts.     Judit Burris MSW LSW

## 2024-03-14 NOTE — DISCHARGE INSTRUCTIONS
Please follow-up with your regular doctor.  Please follow-up with your appointment scheduled to get a psychiatrist.  Return to the emergency department with any other concerning symptoms or other concerns.  Please also return to the ED if you develop any thoughts when to harm yourself, anyone else, or have any hallucinations.

## 2024-03-14 NOTE — Clinical Note
Helena Odonnell was seen and treated in our emergency department on 3/14/2024.  She may return to work on 03/15/2024.       If you have any questions or concerns, please don't hesitate to call.      Mitch Hendrix MD

## 2024-03-16 LAB
ATRIAL RATE: 90 BPM
P AXIS: 57 DEGREES
P OFFSET: 197 MS
P ONSET: 142 MS
PR INTERVAL: 164 MS
Q ONSET: 224 MS
QRS COUNT: 15 BEATS
QRS DURATION: 74 MS
QT INTERVAL: 364 MS
QTC CALCULATION(BAZETT): 445 MS
QTC FREDERICIA: 416 MS
R AXIS: 43 DEGREES
T AXIS: 47 DEGREES
T OFFSET: 406 MS
VENTRICULAR RATE: 90 BPM

## 2024-03-30 ENCOUNTER — APPOINTMENT (OUTPATIENT)
Dept: RADIOLOGY | Facility: HOSPITAL | Age: 39
End: 2024-03-30
Payer: COMMERCIAL

## 2024-03-30 ENCOUNTER — HOSPITAL ENCOUNTER (EMERGENCY)
Facility: HOSPITAL | Age: 39
Discharge: HOME | End: 2024-03-30
Attending: STUDENT IN AN ORGANIZED HEALTH CARE EDUCATION/TRAINING PROGRAM
Payer: COMMERCIAL

## 2024-03-30 ENCOUNTER — CLINICAL SUPPORT (OUTPATIENT)
Dept: EMERGENCY MEDICINE | Facility: HOSPITAL | Age: 39
End: 2024-03-30
Payer: COMMERCIAL

## 2024-03-30 VITALS
SYSTOLIC BLOOD PRESSURE: 115 MMHG | TEMPERATURE: 97.7 F | DIASTOLIC BLOOD PRESSURE: 74 MMHG | HEART RATE: 82 BPM | OXYGEN SATURATION: 100 % | RESPIRATION RATE: 18 BRPM

## 2024-03-30 DIAGNOSIS — R51.9 ACUTE NONINTRACTABLE HEADACHE, UNSPECIFIED HEADACHE TYPE: ICD-10-CM

## 2024-03-30 DIAGNOSIS — Z71.51 ENCOUNTER FOR DRUG REHABILITATION: Primary | ICD-10-CM

## 2024-03-30 LAB
ALBUMIN SERPL BCP-MCNC: 3.8 G/DL (ref 3.4–5)
ALP SERPL-CCNC: 51 U/L (ref 33–110)
ALT SERPL W P-5'-P-CCNC: 8 U/L (ref 7–45)
AMPHETAMINES UR QL SCN: NORMAL
ANION GAP SERPL CALC-SCNC: 16 MMOL/L (ref 10–20)
APAP SERPL-MCNC: <10 UG/ML
AST SERPL W P-5'-P-CCNC: 20 U/L (ref 9–39)
BARBITURATES UR QL SCN: NORMAL
BASOPHILS # BLD AUTO: 0.02 X10*3/UL (ref 0–0.1)
BASOPHILS NFR BLD AUTO: 0.3 %
BENZODIAZ UR QL SCN: NORMAL
BILIRUB SERPL-MCNC: 0.3 MG/DL (ref 0–1.2)
BUN SERPL-MCNC: 8 MG/DL (ref 6–23)
BZE UR QL SCN: NORMAL
CALCIUM SERPL-MCNC: 9 MG/DL (ref 8.6–10.6)
CANNABINOIDS UR QL SCN: NORMAL
CHLORIDE SERPL-SCNC: 104 MMOL/L (ref 98–107)
CO2 SERPL-SCNC: 21 MMOL/L (ref 21–32)
CREAT SERPL-MCNC: 0.5 MG/DL (ref 0.5–1.05)
EGFRCR SERPLBLD CKD-EPI 2021: >90 ML/MIN/1.73M*2
EOSINOPHIL # BLD AUTO: 0.08 X10*3/UL (ref 0–0.7)
EOSINOPHIL NFR BLD AUTO: 1.3 %
ERYTHROCYTE [DISTWIDTH] IN BLOOD BY AUTOMATED COUNT: 14.2 % (ref 11.5–14.5)
ETHANOL SERPL-MCNC: 45 MG/DL
FENTANYL+NORFENTANYL UR QL SCN: NORMAL
GLUCOSE SERPL-MCNC: 77 MG/DL (ref 74–99)
HCT VFR BLD AUTO: 34.3 % (ref 36–46)
HGB BLD-MCNC: 11.7 G/DL (ref 12–16)
IMM GRANULOCYTES # BLD AUTO: 0.01 X10*3/UL (ref 0–0.7)
IMM GRANULOCYTES NFR BLD AUTO: 0.2 % (ref 0–0.9)
LYMPHOCYTES # BLD AUTO: 1.74 X10*3/UL (ref 1.2–4.8)
LYMPHOCYTES NFR BLD AUTO: 29.1 %
MCH RBC QN AUTO: 30 PG (ref 26–34)
MCHC RBC AUTO-ENTMCNC: 34.1 G/DL (ref 32–36)
MCV RBC AUTO: 88 FL (ref 80–100)
METHADONE UR QL SCN: NORMAL
MONOCYTES # BLD AUTO: 0.61 X10*3/UL (ref 0.1–1)
MONOCYTES NFR BLD AUTO: 10.2 %
NEUTROPHILS # BLD AUTO: 3.51 X10*3/UL (ref 1.2–7.7)
NEUTROPHILS NFR BLD AUTO: 58.9 %
NRBC BLD-RTO: 0 /100 WBCS (ref 0–0)
OPIATES UR QL SCN: NORMAL
OXYCODONE+OXYMORPHONE UR QL SCN: NORMAL
PCP UR QL SCN: NORMAL
PLATELET # BLD AUTO: 295 X10*3/UL (ref 150–450)
POTASSIUM SERPL-SCNC: 3.9 MMOL/L (ref 3.5–5.3)
PREGNANCY TEST URINE, POC: NEGATIVE
PROT SERPL-MCNC: 7.3 G/DL (ref 6.4–8.2)
RBC # BLD AUTO: 3.9 X10*6/UL (ref 4–5.2)
SALICYLATES SERPL-MCNC: <3 MG/DL
SODIUM SERPL-SCNC: 137 MMOL/L (ref 136–145)
WBC # BLD AUTO: 6 X10*3/UL (ref 4.4–11.3)

## 2024-03-30 PROCEDURE — 80143 DRUG ASSAY ACETAMINOPHEN: CPT

## 2024-03-30 PROCEDURE — 99283 EMERGENCY DEPT VISIT LOW MDM: CPT | Mod: 25 | Performed by: STUDENT IN AN ORGANIZED HEALTH CARE EDUCATION/TRAINING PROGRAM

## 2024-03-30 PROCEDURE — 2500000002 HC RX 250 W HCPCS SELF ADMINISTERED DRUGS (ALT 637 FOR MEDICARE OP, ALT 636 FOR OP/ED): Mod: SE

## 2024-03-30 PROCEDURE — 2500000001 HC RX 250 WO HCPCS SELF ADMINISTERED DRUGS (ALT 637 FOR MEDICARE OP): Mod: SE

## 2024-03-30 PROCEDURE — 80307 DRUG TEST PRSMV CHEM ANLYZR: CPT

## 2024-03-30 PROCEDURE — 85025 COMPLETE CBC W/AUTO DIFF WBC: CPT

## 2024-03-30 PROCEDURE — 93005 ELECTROCARDIOGRAM TRACING: CPT

## 2024-03-30 PROCEDURE — 36415 COLL VENOUS BLD VENIPUNCTURE: CPT

## 2024-03-30 PROCEDURE — 81025 URINE PREGNANCY TEST: CPT

## 2024-03-30 PROCEDURE — 93010 ELECTROCARDIOGRAM REPORT: CPT | Performed by: STUDENT IN AN ORGANIZED HEALTH CARE EDUCATION/TRAINING PROGRAM

## 2024-03-30 PROCEDURE — 99284 EMERGENCY DEPT VISIT MOD MDM: CPT | Mod: 25 | Performed by: STUDENT IN AN ORGANIZED HEALTH CARE EDUCATION/TRAINING PROGRAM

## 2024-03-30 PROCEDURE — 71046 X-RAY EXAM CHEST 2 VIEWS: CPT | Performed by: STUDENT IN AN ORGANIZED HEALTH CARE EDUCATION/TRAINING PROGRAM

## 2024-03-30 PROCEDURE — 99284 EMERGENCY DEPT VISIT MOD MDM: CPT | Performed by: STUDENT IN AN ORGANIZED HEALTH CARE EDUCATION/TRAINING PROGRAM

## 2024-03-30 PROCEDURE — 80053 COMPREHEN METABOLIC PANEL: CPT

## 2024-03-30 PROCEDURE — 71046 X-RAY EXAM CHEST 2 VIEWS: CPT

## 2024-03-30 RX ORDER — DIAZEPAM 5 MG/ML
5 INJECTION, SOLUTION INTRAMUSCULAR; INTRAVENOUS EVERY 2 HOUR PRN
Status: DISCONTINUED | OUTPATIENT
Start: 2024-03-30 | End: 2024-03-30 | Stop reason: HOSPADM

## 2024-03-30 RX ORDER — LANOLIN ALCOHOL/MO/W.PET/CERES
100 CREAM (GRAM) TOPICAL ONCE
Status: COMPLETED | OUTPATIENT
Start: 2024-03-30 | End: 2024-03-30

## 2024-03-30 RX ORDER — FLUOXETINE HYDROCHLORIDE 20 MG/1
40 CAPSULE ORAL DAILY
Status: DISCONTINUED | OUTPATIENT
Start: 2024-03-30 | End: 2024-03-30 | Stop reason: HOSPADM

## 2024-03-30 RX ORDER — ACETAMINOPHEN 325 MG/1
975 TABLET ORAL ONCE
Status: COMPLETED | OUTPATIENT
Start: 2024-03-30 | End: 2024-03-30

## 2024-03-30 RX ORDER — FOLIC ACID 1 MG/1
1 TABLET ORAL ONCE
Status: COMPLETED | OUTPATIENT
Start: 2024-03-30 | End: 2024-03-30

## 2024-03-30 RX ORDER — MULTIVIT-MIN/IRON FUM/FOLIC AC 7.5 MG-4
1 TABLET ORAL ONCE
Status: COMPLETED | OUTPATIENT
Start: 2024-03-30 | End: 2024-03-30

## 2024-03-30 RX ORDER — QUETIAPINE FUMARATE 100 MG/1
400 TABLET, FILM COATED ORAL NIGHTLY
Status: DISCONTINUED | OUTPATIENT
Start: 2024-03-30 | End: 2024-03-30 | Stop reason: HOSPADM

## 2024-03-30 RX ADMIN — APIXABAN 5 MG: 5 TABLET, FILM COATED ORAL at 02:59

## 2024-03-30 RX ADMIN — ACETAMINOPHEN 975 MG: 325 TABLET ORAL at 02:59

## 2024-03-30 RX ADMIN — FOLIC ACID 1 MG: 1 TABLET ORAL at 08:02

## 2024-03-30 RX ADMIN — Medication 1 TABLET: at 08:02

## 2024-03-30 RX ADMIN — THIAMINE HCL TAB 100 MG 100 MG: 100 TAB at 08:02

## 2024-03-30 RX ADMIN — FLUOXETINE 40 MG: 20 CAPSULE ORAL at 08:02

## 2024-03-30 RX ADMIN — QUETIAPINE FUMARATE 400 MG: 100 TABLET ORAL at 03:00

## 2024-03-30 RX ADMIN — APIXABAN 5 MG: 5 TABLET, FILM COATED ORAL at 08:02

## 2024-03-30 SDOH — HEALTH STABILITY: MENTAL HEALTH: SUICIDAL BEHAVIOR (3 MONTHS): YES

## 2024-03-30 SDOH — HEALTH STABILITY: MENTAL HEALTH: ANXIETY SYMPTOMS: NO PROBLEMS REPORTED OR OBSERVED.

## 2024-03-30 SDOH — HEALTH STABILITY: MENTAL HEALTH: HAVE YOU EVER TRIED TO KILL YOURSELF?: YES

## 2024-03-30 SDOH — HEALTH STABILITY: MENTAL HEALTH
SUICIDAL BEHAVIOR (DESCRIPTION): PATIENT SAID SHE DOESN'T REMEMBER ANY PAST SUICIDE ATTEMPTS BUT CLAIMED TO CC ED THAT SHE TOOK 3 RISPERDOL PILLS IN AN ATTEMPT TO KILL HERSELF. PATIENT SAID SHE DOESN'T REMEMBER BUT KNOWS SHE TRIED TO KILL HERSELF IN THE PAST.

## 2024-03-30 SDOH — HEALTH STABILITY: MENTAL HEALTH: IN THE PAST WEEK, HAVE YOU BEEN HAVING THOUGHTS ABOUT KILLING YOURSELF?: YES

## 2024-03-30 SDOH — HEALTH STABILITY: MENTAL HEALTH: WISH TO BE DEAD (PAST 1 MONTH): YES

## 2024-03-30 SDOH — HEALTH STABILITY: MENTAL HEALTH: WHEN DID YOU TRY TO KILL YOURSELF?: "I DON'T REMEMBER"

## 2024-03-30 SDOH — HEALTH STABILITY: MENTAL HEALTH: DEPRESSION SYMPTOMS: SLEEP DISTURBANCE

## 2024-03-30 SDOH — HEALTH STABILITY: MENTAL HEALTH: BEHAVIORS/MOOD: CALM;COOPERATIVE

## 2024-03-30 SDOH — HEALTH STABILITY: MENTAL HEALTH: NON-SPECIFIC ACTIVE SUICIDAL THOUGHTS (PAST 1 MONTH): NO

## 2024-03-30 SDOH — HEALTH STABILITY: MENTAL HEALTH

## 2024-03-30 SDOH — ECONOMIC STABILITY: HOUSING INSECURITY: FEELS SAFE LIVING IN HOME: NO

## 2024-03-30 SDOH — HEALTH STABILITY: MENTAL HEALTH: IN THE PAST FEW WEEKS, HAVE YOU WISHED YOU WERE DEAD?: YES

## 2024-03-30 SDOH — HEALTH STABILITY: MENTAL HEALTH: ARE YOU HAVING THOUGHTS OF KILLING YOURSELF RIGHT NOW?: NO

## 2024-03-30 SDOH — HEALTH STABILITY: MENTAL HEALTH: IN THE PAST FEW WEEKS, HAVE YOU FELT THAT YOU OR YOUR FAMILY WOULD BE BETTER OFF IF YOU WERE DEAD?: YES

## 2024-03-30 SDOH — HEALTH STABILITY: MENTAL HEALTH: SUICIDAL BEHAVIOR (LIFETIME): YES

## 2024-03-30 SDOH — HEALTH STABILITY: MENTAL HEALTH
HOW DID YOU TRY TO KILL YOURSELF?: PATIENT SAID "I DON'T REMEMBER" (HOWEVER SHE REPORTED AN ATTEMPT TO OD THIS PAST MONTH)

## 2024-03-30 ASSESSMENT — LIFESTYLE VARIABLES
TOTAL SCORE: 0
TREMOR: NO TREMOR
ANXIETY: NO ANXIETY, AT EASE
SUBSTANCE_ABUSE_PAST_12_MONTHS: YES
TOTAL SCORE: 0
PULSE: 78
ANXIETY: NO ANXIETY, AT EASE
PAROXYSMAL SWEATS: NO SWEAT VISIBLE
HEADACHE, FULLNESS IN HEAD: NOT PRESENT
VISUAL DISTURBANCES: NOT PRESENT
ORIENTATION AND CLOUDING OF SENSORIUM: ORIENTED AND CAN DO SERIAL ADDITIONS
AUDITORY DISTURBANCES: NOT PRESENT
NAUSEA AND VOMITING: NO NAUSEA AND NO VOMITING
PAROXYSMAL SWEATS: NO SWEAT VISIBLE
BLOOD PRESSURE: 115/81
NAUSEA AND VOMITING: NO NAUSEA AND NO VOMITING
AUDITORY DISTURBANCES: NOT PRESENT
TREMOR: NO TREMOR
VISUAL DISTURBANCES: NOT PRESENT
AGITATION: NORMAL ACTIVITY
HEADACHE, FULLNESS IN HEAD: NOT PRESENT
ORIENTATION AND CLOUDING OF SENSORIUM: ORIENTED AND CAN DO SERIAL ADDITIONS
AGITATION: NORMAL ACTIVITY
PRESCIPTION_ABUSE_PAST_12_MONTHS: NO

## 2024-03-30 ASSESSMENT — PAIN SCALES - GENERAL
PAINLEVEL_OUTOF10: 0 - NO PAIN
PAINLEVEL_OUTOF10: 0 - NO PAIN

## 2024-03-30 ASSESSMENT — COLUMBIA-SUICIDE SEVERITY RATING SCALE - C-SSRS
6. HAVE YOU EVER DONE ANYTHING, STARTED TO DO ANYTHING, OR PREPARED TO DO ANYTHING TO END YOUR LIFE?: NO
2. HAVE YOU ACTUALLY HAD ANY THOUGHTS OF KILLING YOURSELF?: NO
1. IN THE PAST MONTH, HAVE YOU WISHED YOU WERE DEAD OR WISHED YOU COULD GO TO SLEEP AND NOT WAKE UP?: YES

## 2024-03-30 NOTE — ED TRIAGE NOTES
"Pt BIBA c/c of use of ETOH Crack cocaine and marijuana. Pt states she would like \"to get clean\" pt also complaining of headache and throat pain denies chest pain at this time. Pt also SI. Denies having a plan at this time. States she is feeling very depressed lately. Pt not current on her medication. Takes eliquis for priovios blood clots.    Pt A&Ox3, pt alert calm cooperative with care. Pt resp even and unlabored.     PMH: HTN, blood clots, schizo, bipolar, depression     "

## 2024-03-30 NOTE — PROGRESS NOTES
EPAT - Social Work Psychiatric Assessment    Arrival Details  Mode of Arrival: Ambulatory  Admission Source: Emergency department  Admission Type: Voluntary  EPAT Assessment Start Date: 03/30/24  EPAT Assessment Start Time: 1030  Name of : Yeimy Painting M.Ed.,LO    History of Present Illness  Admission Reason: Drug/Alcohol Use and SI    The patient is a 38 yr old AA female with a history of schizoaffective disorder, polysubstance use disorder (meth, crack/cocaine, alcohol). The patient presents in the ED with concerns of ETOH, crack/cocaine, and cannabis. The patient's BAL was 45 upon arrival to the ED and patient's drug labs were all negative. However, when the patient was seen 2 weeks ago in the ED, her drug labs were positive for methamphetamine and cocaine. The patient is typically positive for methamphetamine and cocaine and occasionally positive for cannabis. Upon being seen by the nurse at and according to triage notes, the patient endorsed SI without any plan. However, when seen by the provider, she reported that she no longer wants to live and is going to overdose on crack/cocaine and drink as much alcohol as she can. The patient denies any HI/AH/VH. The patient reports she has been off of her psychiatric medications for the past month and states that she wants services for alcohol and drug use. The patient scores low risk for suicide on the C-SSRS when assessed in the ED. The patient was referred to EPAT for further psychiatric evaluation.     Patient history was reviewed before EPAT evaluation.     There were 2 attempts made to see the patient before the patient could be woken up enough on the third attempt to complete the EPAT interview.     Psychiatric Diagnosis History: schizoaffective disorder, polysubstance use disorder (meth, crack/cocaine, alcohol)    Psychiatric Medication/Treatment History:     hydrOXYzine HCl 50 mg tablet  Commonly known as: ATARAX  Take 1 tablet by mouth every 6  hours as needed.    paliperidone palmitate 156 mg/mL Syrg injection  Commonly known as: INVEGA SUSTENNA  Inject 1 mL intramuscularly every 4 weeks. Pt is due on April 9 th  Start taking on: April 9, 2024    traZODone 50 mg tablet  Commonly known as: DESYREL  Take 1 tablet by mouth at bedtime as needed.     The patient reportedly had a suicide attempt by overdose on 3/6/2024 and had an inpatient admission to [facility] from 3/6/2024-3/13/2024 and was prescribed and administered the medications listed above at that time. The patient reported to the ED provider that she had not had any psychiatric medications within the past month which is not accurate. She did have and was taking psychiatric medications not too long ago when she was admitted a few weeks ago.     This is the patient's 15th ED visit within 2024. The patient does have a history of homelessness.     SW Readmission Information   Readmission within 30 Days: Yes  Previous ED Visit Date and Reason : 3/20/2024 SOB and ETOH detox  Previous Discharge Date and Location: 3/20/2024  Main ED  Factors Contributing to  Readmission Inpatient/ED (Team Perspective): Homeless, Substance Abuse, Failed to Keep Follow-Up Appointments    Psychiatric Symptoms  Anxiety Symptoms: No problems reported or observed.  Depression Symptoms: Sleep disturbance  Leila Symptoms: No problems reported or observed.    Psychosis Symptoms  Hallucination Type: No problems reported or observed.  Delusion Type: No problems reported or observed.    Additional Symptoms - Adult  Generalized Anxiety Disorder: No problems reported or observed.  Obsessive Compulsive Disorder: No problems reported or observed.  Panic Attack: No problems reported or observed.  Post Traumatic Stress Disorder: No problems reported or observed.  Delirium: No problems reported or observed.  Review of Symptoms Comments: The patient did not really have many symptomatic complaints other than having suicidal  thoughts. The patient reports that she has no plan or intent to act on these thoughts. She also states interest in drug treatment options.    Past Psychiatric History/Meds/Treatments  Past Psychiatric History: schizoaffective disorder, polysubstance use disorder (meth, crack/cocaine, alcohol)  Past Psychiatric Meds/Treatments: hydrOXYzine HCl 50 mg tablet  Commonly known as: ATARAX  Take 1 tablet by mouth every 6 hours as needed.    paliperidone palmitate 156 mg/mL Syrg injection  Commonly known as: INVEGA SUSTENNA  Inject 1 mL intramuscularly every 4 weeks. Pt is due on April 9 th  Start taking on: April 9, 2024    traZODone 50 mg tablet  Commonly known as: DESYREL  Take 1 tablet by mouth at bedtime as needed.     The patient reportedly OD on 3/6/2024 and had an inpatient admission to [facility] from 3/6/2024-3/13/2024 and was prescribed and administered the medications listed above at that time. The patient reported to the ED provider that she had not had any psychiatric medications within the past month which is not accurate. She did have and was taking psychiatric medications not too long ago when she was admitted a few weeks ago.  Past Violence/Victimization History: None reported    Current Mental Health Contacts   Name/Phone Number: N/A  Provider Name/Phone Number: [phone]  Provider Last Appointment Date: Unknown    Support System: Friends, Community    Living Arrangement: Homeless (Patient defensively said she has an apartment but recent notes per medical chart report patient struggles with chronic homelessness.)    Home Safety  Feels Safe Living in Home: No (Says people at apartment don't like her)    Income Information  Employment Status for: Patient  Employment Status: Unemployed  Income Source: Unemployed  Current/Previous Occupation:  (None reported)    Miltary Service/Education History  Current or Previous  Service: None    Social/Cultural History  Social  History: The patient is own guarantor and is a [place] citizen  Cultural Requests During Hospitalization: None  Spiritual Requests During Hospitalization: None  Important Activities: Social    Legal  Legal Considerations:  (None reported)  Assistance with Managing/Advocating Healthcare Needs:  (None reported)  Criminal Activity/ Legal Involvement Pertinent to Current Situation/ Hospitalization: None reported  Legal Concerns: None reported  Legal Comments: None    Drug Screening  Have you used any substances (canabis, cocaine, heroin, hallucinogens, inhalants, etc.) in the past 12 months?: Yes  Have you used any prescription drugs other than prescribed in the past 12 months?: No  Is a toxicology screen needed?: Yes (Read HPI section)    Stage of Change  Stage of Change: Contemplation  History of Treatment: Dual  Type of Treatment Offered: Other (Comment) ([facility])  Treatment Offered: Accepted  Duration of Substance Use: Years  Frequency of Substance Use: At least weekly  Age of First Substance Use: Unknown    Psychosocial  Psychosocial (WDL): Within Defined Limits  Behaviors/Mood: Guarded, Irritable, Sleeping (Patient didn't want to wake up for the assessment and appeared to be irritable when awoken.)  Affect: Appropriate to circumstances    Orientation  Orientation Level: Oriented X4    General Appearance  Motor Activity: Unremarkable  Speech Pattern: Excessively soft, Other (Comment) (Patient is difficult to understand at baseline and kept falling asleep. Was irritated when she had to be woken up for evaluation.)  General Attitude: Cooperative, Hostile  Appearance/Hygiene: Disheveled    Thought Process  Coherency:  (Patient was coherent)  Content: Blaming others, Unremarkable  Delusions:  (None reported or observed)  Perception: Not altered  Hallucination: None  Judgment/Insight: Poor  Confusion: None  Cognition: Follows commands    Sleep Pattern  Sleep Pattern: Difficulty falling asleep, Disturbed/interrupted  "sleep    Risk Factors  Self Harm/Suicidal Ideation Plan: None reported  Previous Self Harm/Suicidal Plans: Patient said, \"I don't remember. It was a while ago\".  Risk Factors: Lower socioeconomic status, Substance abuse  Description of Thoughts/Ideas Leaving Unit Now: Patient wants drug/alcohol treatment    Violence Risk Assessment  Assessment of Violence: On admission  Thoughts of Harm to Others: No    Ability to Assess Risk Screen  Risk Screen - Ability to Assess: Able to be screened  Ask Suicide-Screening Questions  1. In the past few weeks, have you wished you were dead?: Yes  2. In the past few weeks, have you felt that you or your family would be better off if you were dead?: Yes  3. In the past week, have you been having thoughts about killing yourself?: Yes  4. Have you ever tried to kill yourself?: Yes  How did you try to kill yourself?: Patient said \"I don't remember\" (however she reported an attempt to OD this past month)  When did you try to kill yourself?: \"I don't remember\"  5. Are you having thoughts of killing yourself right now?: No  Calculated Risk Score: Potential Risk  [place] Suicide Severity Rating Scale (Screener/Recent Self-Report)  1. Wish to be Dead (Past 1 Month): Yes  2. Non-Specific Active Suicidal Thoughts (Past 1 Month): No  6. Suicidal Behavior (Lifetime): Yes  6. Suicidal Behavior (3 Months): Yes  6. Suicidal Behavior (Description): Patient said she doesn't remember any past suicide attempts but claimed to  ED that she took 3 risperdol pills in an attempt to kill herself. Patient said she doesn't remember but knows she tried to kill herself in the past.  Calculated C-SSRS Risk Score (Lifetime/Recent): High Risk  Step 1: Risk Factors  Current & Past Psychiatric Dx: Alcohol/substance abuse disorders  Presenting Symptoms: Other (Comment) (Reports she wants help with drugs/alcohol and is only having SI but reports that she does not have a plan)  Family History:  (Patient said \"None. " "I'm not here for them I'm here for myself\")  Precipitants/Stressors: Substance intoxication or withdrawal, Pending incarceration or homelessness  Change in Treatment: Recent inpatient discharge, Non-compliant or not receiving treatment  Access to Lethal Methods : No  Step 2: Protective Factors   Protective Factors Internal: Identifies reasons for living  Protective Factors External: Positive therapeutic relationships, Supportive social network or family or friends  Step 3: Suicidal Ideation Intensity  Most Severe Suicidal Ideation Identified: Patient said she is only having thoughts with no plan or intent  How Many Times Have You Had These Thoughts: 2-5 times in a week  When You Have the Thoughts How Long do They Last : Less than 1 hour/some of the time  Could/Can You Stop Thinking About Killing Yourself or Wanting to Die if You Want to: Can control thoughts with some difficulty  Are There Things - Anyone or Anything - That Stopped You From Wanting to Die or Acting on: Deterrents probably stopped you  What Sort of Reasons Did You Have For Thinking About Wanting to Die or Killing Yourself: Mostly to get attention, revenge, or a reaction from others  Total Score: 12  Step 5: Documentation  Risk Level: Moderate suicide risk    Psychiatric Impression and Plan of Care    The patient is a 38 yr old AA female with a history of schizoaffective disorder, polysubstance use disorder (meth, crack/cocaine, alcohol). She presents with concerns of SI and is seeking drug/alcohol treatment. The patient had to be attempted to be seen 3 times before she could be woken up to complete an EPAT assessment. The first attempt was at 2 AM, the second around 8 AM, and then was able to be woken up at 1030 AM to successfully complete the EPAT evaluation.     The patient denies any HI/AH/VH. She endorses having suicidal thoughts with no plan or intent to act on thoughts. The patient stated that she was interested in getting drug/alcohol " "treatment. The patient's BAL upon arrival to the ED was 45. She did test negative for all drugs at today's ED visit, however, when seen just 2 weeks ago in the ED on 3/20/2024 the patient was positive for methamphetamine and cocaine. The patient is typically chronically positive for methamphetamine and cocaine. The patient scored low risk for suicide on the C-SSRS when assessed in the ED and moderate risk for suicide on the C-SSRS when assessed by EPAT. The difference in C-SSRS scores were most likely due to this writer taking into consideration her reported suicide attempt on 3/6/2024 per patient's medical chart history. When asked if she had any prior suicide attempts the patient said, “Yeah but I don't remember what I did or when it was”. The patient was recently released on 3/13/2024 from [facility] for her reported overdose on 3 Risperdal pills in which the patient reported at that time to be an attempt to end her life. Patient's complaints and reports are inconsistent across providers. She does not appear to be an imminent danger to herself or others at this time. The patient is recommended for discharge and referred to [facility] services for substance use treatment options. ED provider agrees with this plan.     Diagnostic Impression: Schizoaffective Disorder, Depression, and polysubstance use disorder (meth, crack/cocaine, alcohol)    The patient was recently seen on 3/20/2024 at [facility] ED for SOB and requested drug/alcohol treatment. According to ED notes, the patient was set up for [facility] then decided last minute to decline [facility]. Notes from 3/20/2024 [facility] written by Chela Fonseca RN below in quotes.  “Pt is now declining assistance from [facility]. Pt provided with additional resources such as \"street card\", \"[facility]\" and \"Substance Abuse Treatment Facilities\".”    Specific Resources Provided to Patient: [facility]  CM " Notified: N/A  PHP/IOP Recommended: None  Specific Information Provided for PHP/IOP: None  Plan Comments: Discharged to [facility]    Outcome/Disposition  Patient's Perception of Outcome Achieved: Unable to assess  Assessment, Recommendations and Risk Level Reviewed with: Mariah Lu MD  Contact Name: Marcus Odonnell  Contact Number(s): [phone]  Contact Relationship: Sibling  EPAT Assessment Completed Date: 03/30/24  EPAT Assessment Completed Time: 1252  Patient Disposition: Home

## 2024-03-30 NOTE — ED PROVIDER NOTES
CC: Drug / Alcohol Assessment and Headache     HPI:  Patient is a 38-year-old female with a past medical history of schizoaffective disorder, GERD, HLD, migraine, and history of DVT/PE noncompliant with AC, and depression who presents to the ED for SI, substance use treatment, and chronic headache as well as chest pain.  Patient notes that she no longer wants to live and wants to overdose on her crack cocaine as well as drink as much alcohol as possible.  Patient denies any triggers.  She notes that she is just tired of living.  Denied HI or AVH.  Patient notes she has been off her psychiatric medications for the past month.  Patient is unsure of her medications.  Her medications upon chart review include Fluoxetine, lithium, and quetiapine.  Patient notes that she would want to see services for alcohol as well as substance use.  Patient is also noting a chronic posterior headache that feels typical for her migraines.  Patient is declining any medications at this time.  Patient notes chronic midsternal chest pain over the past 6 months that is a pounding sensation.  Of note, Patient was evaluated at [facility] ED on 3/20/2024 and was noted to have a negative CT PE.  She was noted to undergo a negative CT head on 3/5/2024 at [facility].  Also of note, patient was admitted from 3/6/2024 through 3/13/2024 for suicidal ideation. Denied trauma, falls, difficulty breathing, nausea, vomiting, abdominal pain,    Limitations to history: Crack cocaine and alcohol use  Independent historian(s): None  Records Reviewed: Recent available ED and inpatient notes reviewed in EMR.    PMHx/PSHx:  Per HPI.   - has no past medical history on file.  - has no past surgical history on file.    Medications:  Reviewed in EMR. See EMR for complete list of medications and doses.    Allergies:  Flagyl [metronidazole] and Sulfamethoxazole-trimethoprim    Social History:  - Tobacco:  has no history on file for tobacco use.   - Alcohol:  has no  history on file for alcohol use.   - Illicit Drugs:  has no history on file for drug use.     ROS:  Per HPI.       ???????????????????????????????????????????????????????????????  Triage Vitals:  T 36.6 °C (97.9 °F)  HR 77  /84  RR 18  O2 100 %      Physical Exam    General: Patient resting comfortably in bed, no acute distress, breathing easily, well appearing, and appropriately conversational without confusion or gross mental status changes.  Head: Normocephalic. Atraumatic.    Neck: No meningismus. No midline cervical spine tenderness with palpation. FROM. No gross masses.   Eyes: EOMI. No scleral icterus or injection.   ENT: Moist mucous membranes, no apparent trauma or lesions.  CV: Regular rhythm. No murmurs, rubs, gallops appreciated. 2+ radial pulses bilaterally.  Resp: Clear to auscultation bilaterally. No respiratory distress.   GI: Soft, non-distended. No tenderness with palpation. No rebound tenderness or guarding. No palpable masses.  : No suprapubic or CVA tenderness.   MSK: TTP of the midsternal chest.  No gross step offs or deformities.  EXT: No peripheral edema, contusions, or wounds.  Skin: Warm and dry, no rashes or lesions.  Neuro: No focal neurological deficits from stated baseline. Speech fluent.  Psych: Notes SI.  Denied HI or AVH.    ???????????????????????????????????????????????????????????????  Labs:   Labs Reviewed - No data to display     Imaging:   No orders to display        EKG:  Rate is 70, sinus rhythm, normal axis, no interval prolongation, no st elevation or depression.  When compared to EKG on 3/14/2024 review of EKG does not show any signs of STEMI, complete heart block, asystole, V-fib.     MDM:  Patient is a 38-year-old female with a past medical history of schizoaffective disorder, GERD, HLD, migraine, and history of DVT/PE noncompliant with AC, and depression who presents to the ED for SI, substance use treatment, and chronic headache as well as chest pain.   Patient presented HDS.  Physical exam findings significant for a 38-year-old female with suicidal ideation.  Low clinical concern for ICH, sinus venous thrombosis, meningitis, PE, pneumothorax, and dissection.  Patient's headache is noted to be chronic.  Patient noted that initially she did not want any medications for her headache and wanted evaluation.  Patient has been noted to undergo a CT head on 3/5/2024 noting no change in her headache from that time.  Patient initially declined medication but was amenable to Tylenol.  CXR did not display an acute cardiopulmonary process Including findings concerning for pneumonia.  EKG did not display findings concerning for ischemia.  Patient's chest pain is noted to be chronic with no change in her chest pain since cardiac evaluation including CT PE on 3/20/2024.  Patient noted to have an elevated alcohol level 45.  Metabolic panel was unremarkable.  Urine pregnancy testing was negative.  CBC significant for no leukocytosis and mild anemia.  Patient signed out to Dr. Lu in stable condition pending EPAT evaluation.     ED Course:  ED Course as of 03/31/24 1703   Sat Mar 30, 2024   1110 Drug Screen, Urine [RR]      ED Course User Index  [RR] Mariah Lu MD         Diagnoses as of 03/31/24 1703   Encounter for drug rehabilitation   Acute nonintractable headache, unspecified headache type       Social Determinants Limiting Care:  Housing insecurity    Disposition:  Patient signed out to Dr. Lu in stable condition pending EPAT evaluation.    Brandon Nielson MD   Emergency Medicine PGY-2  [facility]    Comment: Please note this report has been produced using speech recognition software and may contain errors related to that system including errors in grammar, punctuation, and spelling as well as words and phrases that may be inappropriate.  If there are any questions or concerns please feel free to contact the dictating  provider for clarification.    Procedures ? Smart[employer] last updated 3/30/2024 1:51 AM        Brandon Nielson MD  Resident  03/31/24 [id number]

## 2024-03-30 NOTE — PROGRESS NOTES
I assumed care of this patient at 7 AM.    Briefly this is a 38-year-old male with past medical history of schizoaffective disorder, noncompliant history of anticoagulant use for DVT/PE, GERD presenting due to questionable SI, paranoid delusions.  Patient was seen by EPAT and cleared.  She denies any SI, HI, AH or VH to me.  Patient was seen by [facility] and was excepted to the [facility].  Patient had transportation arranged and was discharged in hemodynamically stable condition with return precautions discussed.  Patient care was overseen seen by attending physician agrees with the plan disposition.    Mariah Lu MD  Emergency Medicine - PGY2  [facility]  [id number] [address] to [facility] [id number]

## 2024-04-03 LAB
ATRIAL RATE: 72 BPM
P AXIS: 58 DEGREES
P OFFSET: 198 MS
P ONSET: 141 MS
PR INTERVAL: 170 MS
Q ONSET: 226 MS
QRS COUNT: 12 BEATS
QRS DURATION: 76 MS
QT INTERVAL: 396 MS
QTC CALCULATION(BAZETT): 433 MS
QTC FREDERICIA: 421 MS
R AXIS: 12 DEGREES
T AXIS: 28 DEGREES
T OFFSET: 424 MS
VENTRICULAR RATE: 72 BPM

## 2024-06-12 ENCOUNTER — HOSPITAL ENCOUNTER (EMERGENCY)
Facility: HOSPITAL | Age: 39
Discharge: HOME | End: 2024-06-12
Attending: STUDENT IN AN ORGANIZED HEALTH CARE EDUCATION/TRAINING PROGRAM
Payer: COMMERCIAL

## 2024-06-12 ENCOUNTER — CLINICAL SUPPORT (OUTPATIENT)
Dept: EMERGENCY MEDICINE | Facility: HOSPITAL | Age: 39
End: 2024-06-12
Payer: COMMERCIAL

## 2024-06-12 VITALS
RESPIRATION RATE: 16 BRPM | DIASTOLIC BLOOD PRESSURE: 78 MMHG | WEIGHT: 190 LBS | SYSTOLIC BLOOD PRESSURE: 125 MMHG | HEART RATE: 76 BPM | TEMPERATURE: 98.4 F | HEIGHT: 69 IN | BODY MASS INDEX: 28.14 KG/M2 | OXYGEN SATURATION: 98 %

## 2024-06-12 DIAGNOSIS — F14.921: ICD-10-CM

## 2024-06-12 DIAGNOSIS — R45.851 SUICIDAL IDEATION: Primary | ICD-10-CM

## 2024-06-12 LAB
ALBUMIN SERPL BCP-MCNC: 4.1 G/DL (ref 3.4–5)
ALP SERPL-CCNC: 60 U/L (ref 33–110)
ALT SERPL W P-5'-P-CCNC: 23 U/L (ref 7–45)
AMPHETAMINES UR QL SCN: ABNORMAL
ANION GAP SERPL CALC-SCNC: 15 MMOL/L (ref 10–20)
APAP SERPL-MCNC: <10 UG/ML
AST SERPL W P-5'-P-CCNC: 36 U/L (ref 9–39)
ATRIAL RATE: 59 BPM
BARBITURATES UR QL SCN: ABNORMAL
BASOPHILS # BLD AUTO: 0.04 X10*3/UL (ref 0–0.1)
BASOPHILS NFR BLD AUTO: 0.7 %
BENZODIAZ UR QL SCN: ABNORMAL
BILIRUB SERPL-MCNC: 0.3 MG/DL (ref 0–1.2)
BUN SERPL-MCNC: 6 MG/DL (ref 6–23)
BZE UR QL SCN: ABNORMAL
CALCIUM SERPL-MCNC: 9.2 MG/DL (ref 8.6–10.6)
CANNABINOIDS UR QL SCN: ABNORMAL
CHLORIDE SERPL-SCNC: 103 MMOL/L (ref 98–107)
CO2 SERPL-SCNC: 24 MMOL/L (ref 21–32)
CREAT SERPL-MCNC: 0.57 MG/DL (ref 0.5–1.05)
EGFRCR SERPLBLD CKD-EPI 2021: >90 ML/MIN/1.73M*2
EOSINOPHIL # BLD AUTO: 0.05 X10*3/UL (ref 0–0.7)
EOSINOPHIL NFR BLD AUTO: 0.8 %
ERYTHROCYTE [DISTWIDTH] IN BLOOD BY AUTOMATED COUNT: 14.6 % (ref 11.5–14.5)
ETHANOL SERPL-MCNC: 11 MG/DL
FENTANYL+NORFENTANYL UR QL SCN: ABNORMAL
GLUCOSE SERPL-MCNC: 71 MG/DL (ref 74–99)
HCT VFR BLD AUTO: 33.9 % (ref 36–46)
HGB BLD-MCNC: 11.9 G/DL (ref 12–16)
IMM GRANULOCYTES # BLD AUTO: 0.01 X10*3/UL (ref 0–0.7)
IMM GRANULOCYTES NFR BLD AUTO: 0.2 % (ref 0–0.9)
LYMPHOCYTES # BLD AUTO: 1.71 X10*3/UL (ref 1.2–4.8)
LYMPHOCYTES NFR BLD AUTO: 28.1 %
MCH RBC QN AUTO: 30.6 PG (ref 26–34)
MCHC RBC AUTO-ENTMCNC: 35.1 G/DL (ref 32–36)
MCV RBC AUTO: 87 FL (ref 80–100)
METHADONE UR QL SCN: ABNORMAL
MONOCYTES # BLD AUTO: 0.64 X10*3/UL (ref 0.1–1)
MONOCYTES NFR BLD AUTO: 10.5 %
NEUTROPHILS # BLD AUTO: 3.64 X10*3/UL (ref 1.2–7.7)
NEUTROPHILS NFR BLD AUTO: 59.7 %
NRBC BLD-RTO: 0 /100 WBCS (ref 0–0)
OPIATES UR QL SCN: ABNORMAL
OXYCODONE+OXYMORPHONE UR QL SCN: ABNORMAL
P AXIS: 52 DEGREES
P OFFSET: 199 MS
P ONSET: 140 MS
PCP UR QL SCN: ABNORMAL
PLATELET # BLD AUTO: 296 X10*3/UL (ref 150–450)
POTASSIUM SERPL-SCNC: 4 MMOL/L (ref 3.5–5.3)
PR INTERVAL: 166 MS
PREGNANCY TEST URINE, POC: NEGATIVE
PROT SERPL-MCNC: 7.6 G/DL (ref 6.4–8.2)
Q ONSET: 223 MS
QRS COUNT: 9 BEATS
QRS DURATION: 74 MS
QT INTERVAL: 432 MS
QTC CALCULATION(BAZETT): 427 MS
QTC FREDERICIA: 429 MS
R AXIS: 30 DEGREES
RBC # BLD AUTO: 3.89 X10*6/UL (ref 4–5.2)
SALICYLATES SERPL-MCNC: <3 MG/DL
SODIUM SERPL-SCNC: 138 MMOL/L (ref 136–145)
T AXIS: 35 DEGREES
T OFFSET: 439 MS
VENTRICULAR RATE: 59 BPM
WBC # BLD AUTO: 6.1 X10*3/UL (ref 4.4–11.3)

## 2024-06-12 PROCEDURE — 2500000004 HC RX 250 GENERAL PHARMACY W/ HCPCS (ALT 636 FOR OP/ED): Mod: SE | Performed by: STUDENT IN AN ORGANIZED HEALTH CARE EDUCATION/TRAINING PROGRAM

## 2024-06-12 PROCEDURE — 84075 ASSAY ALKALINE PHOSPHATASE: CPT

## 2024-06-12 PROCEDURE — 80053 COMPREHEN METABOLIC PANEL: CPT

## 2024-06-12 PROCEDURE — 99284 EMERGENCY DEPT VISIT MOD MDM: CPT | Performed by: STUDENT IN AN ORGANIZED HEALTH CARE EDUCATION/TRAINING PROGRAM

## 2024-06-12 PROCEDURE — 80320 DRUG SCREEN QUANTALCOHOLS: CPT

## 2024-06-12 PROCEDURE — 36415 COLL VENOUS BLD VENIPUNCTURE: CPT

## 2024-06-12 PROCEDURE — 93005 ELECTROCARDIOGRAM TRACING: CPT

## 2024-06-12 PROCEDURE — 99285 EMERGENCY DEPT VISIT HI MDM: CPT

## 2024-06-12 PROCEDURE — 80307 DRUG TEST PRSMV CHEM ANLYZR: CPT

## 2024-06-12 PROCEDURE — 85025 COMPLETE CBC W/AUTO DIFF WBC: CPT

## 2024-06-12 PROCEDURE — 81025 URINE PREGNANCY TEST: CPT

## 2024-06-12 PROCEDURE — 96372 THER/PROPH/DIAG INJ SC/IM: CPT | Performed by: STUDENT IN AN ORGANIZED HEALTH CARE EDUCATION/TRAINING PROGRAM

## 2024-06-12 RX ORDER — MIDAZOLAM HYDROCHLORIDE 5 MG/ML
5 INJECTION, SOLUTION INTRAMUSCULAR; INTRAVENOUS ONCE
Status: COMPLETED | OUTPATIENT
Start: 2024-06-12 | End: 2024-06-12

## 2024-06-12 RX ORDER — OLANZAPINE 10 MG/2ML
5 INJECTION, POWDER, FOR SOLUTION INTRAMUSCULAR ONCE AS NEEDED
Status: COMPLETED | OUTPATIENT
Start: 2024-06-12 | End: 2024-06-12

## 2024-06-12 RX ORDER — MIDAZOLAM HYDROCHLORIDE 1 MG/ML
5 INJECTION INTRAMUSCULAR; INTRAVENOUS ONCE
Status: DISCONTINUED | OUTPATIENT
Start: 2024-06-12 | End: 2024-06-12

## 2024-06-12 RX ORDER — OXCARBAZEPINE 60 MG/ML
600 SUSPENSION ORAL ONCE
Status: DISCONTINUED | OUTPATIENT
Start: 2024-06-12 | End: 2024-06-12

## 2024-06-12 RX ORDER — OLANZAPINE 10 MG/2ML
INJECTION, POWDER, FOR SOLUTION INTRAMUSCULAR
Status: COMPLETED
Start: 2024-06-12 | End: 2024-06-12

## 2024-06-12 RX ORDER — MIDAZOLAM HYDROCHLORIDE 5 MG/ML
INJECTION, SOLUTION INTRAMUSCULAR; INTRAVENOUS
Status: COMPLETED
Start: 2024-06-12 | End: 2024-06-12

## 2024-06-12 SDOH — HEALTH STABILITY: MENTAL HEALTH: DEPRESSION SYMPTOMS: INCREASED IRRITABILITY;IMPAIRED CONCENTRATION;SLEEP DISTURBANCE

## 2024-06-12 SDOH — HEALTH STABILITY: MENTAL HEALTH: IN THE PAST WEEK, HAVE YOU BEEN HAVING THOUGHTS ABOUT KILLING YOURSELF?: YES

## 2024-06-12 SDOH — HEALTH STABILITY: MENTAL HEALTH: IN THE PAST FEW WEEKS, HAVE YOU WISHED YOU WERE DEAD?: NO

## 2024-06-12 SDOH — HEALTH STABILITY: MENTAL HEALTH: SUICIDAL BEHAVIOR (DESCRIPTION): OVERDOSE

## 2024-06-12 SDOH — HEALTH STABILITY: MENTAL HEALTH: SUICIDAL BEHAVIOR (3 MONTHS): NO

## 2024-06-12 SDOH — HEALTH STABILITY: MENTAL HEALTH: DELUSIONS: OTHER (COMMENT)

## 2024-06-12 SDOH — HEALTH STABILITY: MENTAL HEALTH: ACTIVE SUICIDAL IDEATION WITH SOME INTENT TO ACT, WITHOUT SPECIFIC PLAN (PAST 1 MONTH): NO

## 2024-06-12 SDOH — HEALTH STABILITY: MENTAL HEALTH: CONTENT: UNREMARKABLE

## 2024-06-12 SDOH — HEALTH STABILITY: MENTAL HEALTH: ANXIETY SYMPTOMS: GENERALIZED

## 2024-06-12 SDOH — HEALTH STABILITY: MENTAL HEALTH

## 2024-06-12 SDOH — HEALTH STABILITY: MENTAL HEALTH: HOW DID YOU TRY TO KILL YOURSELF?: OVERDOSE

## 2024-06-12 SDOH — HEALTH STABILITY: MENTAL HEALTH: ACTIVE SUICIDAL IDEATION WITH SPECIFIC PLAN AND INTENT (PAST 1 MONTH): NO

## 2024-06-12 SDOH — HEALTH STABILITY: MENTAL HEALTH: HAVE YOU EVER TRIED TO KILL YOURSELF?: YES

## 2024-06-12 SDOH — HEALTH STABILITY: MENTAL HEALTH: BEHAVIORS/MOOD: CALM;COOPERATIVE;WITHDRAWN

## 2024-06-12 SDOH — HEALTH STABILITY: MENTAL HEALTH: WISH TO BE DEAD (PAST 1 MONTH): NO

## 2024-06-12 SDOH — HEALTH STABILITY: MENTAL HEALTH: SUICIDAL BEHAVIOR (LIFETIME): YES

## 2024-06-12 SDOH — ECONOMIC STABILITY: HOUSING INSECURITY: FEELS SAFE LIVING IN HOME: NO

## 2024-06-12 SDOH — ECONOMIC STABILITY: GENERAL: FINANCIAL CONCERNS: UNABLE TO AFFORD FOOD

## 2024-06-12 SDOH — HEALTH STABILITY: MENTAL HEALTH: ARE YOU HAVING THOUGHTS OF KILLING YOURSELF RIGHT NOW?: NO

## 2024-06-12 SDOH — HEALTH STABILITY: MENTAL HEALTH: NON-SPECIFIC ACTIVE SUICIDAL THOUGHTS (PAST 1 MONTH): YES

## 2024-06-12 SDOH — HEALTH STABILITY: MENTAL HEALTH: IN THE PAST FEW WEEKS, HAVE YOU FELT THAT YOU OR YOUR FAMILY WOULD BE BETTER OFF IF YOU WERE DEAD?: NO

## 2024-06-12 ASSESSMENT — COLUMBIA-SUICIDE SEVERITY RATING SCALE - C-SSRS
6. HAVE YOU EVER DONE ANYTHING, STARTED TO DO ANYTHING, OR PREPARED TO DO ANYTHING TO END YOUR LIFE?: YES
2. HAVE YOU ACTUALLY HAD ANY THOUGHTS OF KILLING YOURSELF?: YES
1. IN THE PAST MONTH, HAVE YOU WISHED YOU WERE DEAD OR WISHED YOU COULD GO TO SLEEP AND NOT WAKE UP?: YES
6. HAVE YOU EVER DONE ANYTHING, STARTED TO DO ANYTHING, OR PREPARED TO DO ANYTHING TO END YOUR LIFE?: NO
5. HAVE YOU STARTED TO WORK OUT OR WORKED OUT THE DETAILS OF HOW TO KILL YOURSELF? DO YOU INTEND TO CARRY OUT THIS PLAN?: NO
4. HAVE YOU HAD THESE THOUGHTS AND HAD SOME INTENTION OF ACTING ON THEM?: NO

## 2024-06-12 ASSESSMENT — LIFESTYLE VARIABLES
PRESCIPTION_ABUSE_PAST_12_MONTHS: NO
SUBSTANCE_ABUSE_PAST_12_MONTHS: YES

## 2024-06-12 ASSESSMENT — PAIN SCALES - GENERAL: PAINLEVEL_OUTOF10: 0 - NO PAIN

## 2024-06-12 ASSESSMENT — PAIN DESCRIPTION - PROGRESSION: CLINICAL_PROGRESSION: NOT CHANGED

## 2024-06-12 ASSESSMENT — PAIN - FUNCTIONAL ASSESSMENT: PAIN_FUNCTIONAL_ASSESSMENT: 0-10

## 2024-06-12 NOTE — ED PROVIDER NOTES
"CC: Suicidal     HPI: Patient is a 38 old female with history of schizoaffective disorder, GERD, HLD, migraine, presenting to the emergency department today for suicidal ideation.  Coming in because she is having more depressed thoughts recently.  On arrival to the emergency department, patient was asked what brought her to the emergency department today.  Immediately started exploding on provider saying \"Why you guys keep interrogating me.  I will literally go outside and blow my brains out right now.\"  Medically wise, she expresses chronic abdominal pain not worse than baseline.  Denies fevers, chills, chest pain, shortness of breath, or changes in stool today.      Records Reviewed: Recent available ED and inpatient notes reviewed in EMR.    PMHx/PSHx:  Per HPI.   - has no past medical history on file.  - has no past surgical history on file.  - does not have a problem list on file.    Medications:  Current Outpatient Medications   Medication Instructions    FLUoxetine (PROZAC) 40 mg, oral, Daily    lithium ER (LITHOBID) 600 mg, oral, 2 times daily    QUEtiapine (SEROQUEL) 400 mg, oral, Nightly        Allergies:  Iodine, Metronidazole, and Sulfamethoxazole-trimethoprim    Social History:  - Tobacco:  has no history on file for tobacco use.   - Alcohol:  has no history on file for alcohol use.   - Illicit Drugs:  has no history on file for drug use.     ROS:  Per HPI.     ???????????????????????????????????????????????????????????????  Triage Vitals:  T 36.9 °C (98.4 °F)  HR 74  BP (!) 160/105  RR 16  O2 100 % None (Room air)    Physical Exam  Constitutional:       Appearance: Normal appearance.   HENT:      Head: Normocephalic and atraumatic.   Cardiovascular:      Rate and Rhythm: Normal rate and regular rhythm.      Heart sounds: Normal heart sounds.   Pulmonary:      Effort: Pulmonary effort is normal.      Breath sounds: Normal breath sounds.   Abdominal:      Palpations: Abdomen is soft.      Tenderness: " "There is no abdominal tenderness.   Musculoskeletal:         General: Normal range of motion.   Skin:     General: Skin is warm.   Neurological:      General: No focal deficit present.      Mental Status: She is alert and oriented to person, place, and time.   Psychiatric:         Mood and Affect: Mood is depressed. Affect is angry, tearful and inappropriate.         Speech: Speech is rapid and pressured.         Behavior: Behavior is uncooperative.         Thought Content: Thought content is delusional. Thought content includes suicidal ideation. Thought content includes suicidal plan.         Judgment: Judgment is impulsive.               Loretto Coma Scale Score: 15                  ???????????????????????????????????????????????????????????????    Results: Relevant laboratory and radiographic results were reviewed and independently interpreted by myself.  As necessary, they are commented on in the ED Course.    EKG: EKG interpreted by myself. Please see ED Course for full interpretation.    Medical Decision Making   Patient is a 38-year-old female presenting to the emergency department today for suicidal ideation.  On arrival, blood pressure 160/105, rest of vitals within normal limits.  On examination, patient appears depressed, suicidal, and very combative.  Expresses to the provider that she has attempted suicide multiple times in the past and she is prepared to do this again today.  She has suffered from depression and suicidal ideation for a long time now and she appears severely decompensated for me.  Verbal attempts at de-escalation were made and patient stood up and became physically aggressive with staff.  She says \"you guys do not care about me and are all laughing at me.\"  Attempted to switch providers but patient continued to decompensate and spiral. Appears to be splitting for us today as she calmly was talking to one of the nurses. Given her worsening aggression, had to be medicated with 5 mg IM " Zyprexa as well as 5 mg IM Versed to protect herself/staff. Was brought into a room and placed on a monitor at this time.  Will get basic labs including CBC, CMP, acute tox panel, and urine drug screen today for further evaluation. Patient signed out to oncoming provider with labs and final EPAT recommendations pending.      Diagnoses as of 06/12/24 7368   Suicidal ideation   Cocaine intoxication with delirium (Multi)       Social Determinants Limiting Care:  None identified    Disposition:   Sign Out    Joshua Chau MD  Emergency Medicine PGY2      Procedures ? Smart[employer] last updated 6/12/2024 11:19 PM        Joshua Chau MD  Resident  06/12/24 1781

## 2024-06-12 NOTE — PROGRESS NOTES
EPAT - Social Work Psychiatric Assessment    Arrival Details  Mode of Arrival: Ambulatory  Admission Source: Home  Admission Type: Voluntary  EPAT Assessment Start Date: 06/12/24  EPAT Assessment Start Time: 1545  [facility] of : [facility]    History of Present Illness  Admission Reason: Suicidal Ideation  HPI: Patient, Helena Odonnell, is a 38 year old female with history of schizophrenia versus schizoaffective disorder, bipolar type; bipolar 1, depression, and polysubstance use disorder (cocaine, PCP, alcohol). Patient presented to ED with complaint of suicidal ideation. Patient reportedly combative and verbally aggressive with ED staff upon arrival in ED. Patient reported yelling at provider with concern about provider interrogating patient and stating intention to go outside and “blow my brains out”. Patient endorsed suicidal ideation and worsening depression to ED provider. Provider note did not indicate any mention of homicidal ideation or hallucinations during initial assessment.     Patient’s chart, community record, provider note, triage note, labs, and C-SSRS score reviewed. Patient’s chart shows history of mental health diagnoses, inpatient psychiatric hospitalizations, and multiple EPAT assessments. Patient’s most recent EPAT assessment noted on 03/30/2024 with recommendation for discharge. Patient had been evaluated at [facility] ED on 06/01/2024 for similar complaint and recommended for discharge. Patient’s inpatient psychiatric hospitalization history show patient gaining little benefit from hospitalizations due to continued medication and treatment non-compliance.     Patient declined collateral contacts stating “I don’t have a friend in the world”.    SW Readmission Information   Readmission within 30 Days: Yes  Previous ED Visit Date and Reason : 06/01/2024, Spider bite hallucinations.  Previous Discharge Date and Location: 06/01/2024, [facility]  Factors Contributing to  Readmission  "Inpatient/ED (Team Perspective): Med Compliance/Difficulty Obtaining, Transportation Issues, Substance Abuse  Readmission Factors Team Comments: Treatment non-compliance and substance use.  Factors Contributing to Readmission (Patient/Family Perspective): Treatment non-compliance and substance use.    Psychiatric Symptoms  Anxiety Symptoms: Generalized  Depression Symptoms: Increased irritability, Impaired concentration, Sleep disturbance  Leila Symptoms: Poor judgment    Psychosis Symptoms  Hallucination Type: Visual  Delusion Type: Persecutory, Paranoid    Additional Symptoms - Adult  Generalized Anxiety Disorder: Excessive anxiety/worry, Irritability  Obsessive Compulsive Disorder: No problems reported or observed.  Panic Attack: No problems reported or observed.  Post Traumatic Stress Disorder: Other (Comment) (Prior charting indicates history of PTSD diagnosis with no additional information provided by patient.)  Delirium: No problems reported or observed.  Review of Symptoms Comments: Patient reported chronic stressors including housing, substance use, and treatment non-compliance. Patient discussed incrased racing thoughts recently when asked about hallucinations. Patient had initally noted hallucination of spider bite when initally evaluated by ED provider. Patient reported suicidal ideation with plan to jump off a bridge. Patient later reported \"I'm not trying to die\". Patient denied homicidal ideation.    Past Psychiatric History/Meds/Treatments  Past Psychiatric History: Patient has history of schizoaffective bipolar type; schizophrenia, anxiety, depression, PTSD, and polysubstance use disorder.  Past Psychiatric Meds/Treatments: Patient reported no current compliance with mental health medications. Most recent medication list from March of 2024. Patient has history of inpatient psychiatric hospitalizations with most recent noted in early March of 2024.  Past Violence/Victimization History: Patient " reportedly agitated and verbally aggressive with ED provider prior to EPAT assessment.    Current Mental Health Contacts   Name/Phone Number: Unreported   Last Appointment Date: NA  Provider Name/Phone Number: Unreported  Provider Last Appointment Date: NA    Support System: Community    Living Arrangement: Apartment    Home Safety  Feels Safe Living in Home: No  Potentially Unsafe Housing Conditions: Unable to Assess  Home Safety : Patient reported living in [facility] housing and not feeling safe due to amount of people around in the apartment community.    Income Information  Employment Status for: Patient  Employment Status: Disabled  Income Source: Disability  Current/Previous Occupation: Unable to Assess  Shift Worked:  (Unreported)  Income/Expense Information: Expenses exceed income  Financial Concerns: Unable to Afford Food  Who Manages Finances if Patient Unable: Unreported  Employment/ Finance Comments: Patient did not discuss employment. Patient reported struggling to access food due to having no food stamps.    [place] Service/Education History  Current or Previous  Service: None   Experience: Other (Comment) (Unreported)  Education Level: Other (Comment) (Did not discuss)  History of Learning Problems: No  History of School Behavior Problems: No  School History: Patient did not discuss school history.    Social/Cultural History  Social History: Patient is a 38 year old  female with brown skin, brown hair, wearing hospital gear. Patient appeared poorly groomed and close to stated age.  Cultural Requests During Hospitalization: Unreported  Spiritual Requests During Hospitalization: Unreported  Important Activities: Social    Legal  Legal Considerations: Patient/ Family Ability to Make Healthcare Decisions  Assistance with Managing/Advocating Healthcare Needs: Other (Comment) (Unreported)  Criminal Activity/ Legal Involvement Pertinent to Current Situation/  "Hospitalization: Unreported  Legal Concerns: Patient has two ongoing court cases and missed court date on 06/10/2024 where bond was revoked.  Legal Comments: Unreported    Drug Screening  Have you used any substances (canabis, cocaine, heroin, hallucinogens, inhalants, etc.) in the past 12 months?: Yes  Have you used any prescription drugs other than prescribed in the past 12 months?: No  Is a toxicology screen needed?: Yes    Stage of Change  Stage of Change: Precontemplation  History of Treatment: Inpatient, Dual  Type of Treatment Offered: AA/NA meeting resource  Treatment Offered: Declined  Duration of Substance Use: Unreported  Frequency of Substance Use: Daily  Age of First Substance Use: Unreported    Psychosocial  Psychosocial (WDL): Exceptions to WDL  Behaviors/Mood: Calm, Cooperative, Guarded  Affect: Appropriate to circumstances  Parent/Guardian/Significant Other Involvement: No involvement  Family Behaviors: Unable to assess  Visitor Behaviors: Unable to assess  Needs Expressed: Financial  Emotional Support Given: Reassure    Orientation  Orientation Level: Oriented X4    General Appearance  Motor Activity: Unremarkable  Speech Pattern: Other (Comment) (Unremarkable)  General Attitude: Cooperative, Guarded  Appearance/Hygiene: Poor hygiene    Thought Process  Coherency: Circumstantial  Content: Blaming others  Delusions: Persecutory  Perception: Not altered  Hallucination: None  Judgment/Insight: Poor  Confusion: None  Cognition: Poor safety awareness, Poor attention/concentration, Poor judgement    Sleep Pattern  Sleep Pattern: Disturbed/interrupted sleep, Restlessness    Risk Factors  Self Harm/Suicidal Ideation Plan: Patient reported suicidal ideation with plan to jump off bridge initally. Patient later reported \"I'm not trying to die\".  Previous Self Harm/Suicidal Plans: Patient has history of suicide attempt via overdose.  Risk Factors: Lower socioeconomic status, Major mental illness, Personality " disorder (antisocial, borderline), Substance abuse  Description of Thoughts/Ideas Leaving Unit Now: Patient reporting desire to go to an inpatient psychiatric hospital.    Violence Risk Assessment  Assessment of Violence: None noted  Thoughts of Harm to Others: No    Ability to Assess Risk Screen  Risk Screen - Ability to Assess: Able to be screened  Ask Suicide-Screening Questions  1. In the past few weeks, have you wished you were dead?: No  2. In the past few weeks, have you felt that you or your family would be better off if you were dead?: No  3. In the past week, have you been having thoughts about killing yourself?: Yes  4. Have you ever tried to kill yourself?: Yes  How did you try to kill yourself?: Overdose  When did you try to kill yourself?: March of 2024  5. Are you having thoughts of killing yourself right now?: No  Calculated Risk Score: Potential Risk  Webb Suicide Severity Rating Scale (Screener/Recent Self-Report)  1. Wish to be Dead (Past 1 Month): No  2. Non-Specific Active Suicidal Thoughts (Past 1 Month): Yes  3. Active Suicidal Ideation with any Methods (Not Plan) Without Intent to Act (Past 1 Month): Yes  4. Active Suicidal Ideation with Some Intent to Act, Without Specific Plan (Past 1 Month): No  5. Active Suicidal Ideation with Specific Plan and Intent (Past 1 Month): No  6. Suicidal Behavior (Lifetime): Yes  6. Suicidal Behavior (3 Months): No  6. Suicidal Behavior (Description): Overdose  Calculated C-SSRS Risk Score (Lifetime/Recent): Moderate Risk  Step 1: Risk Factors  Current & Past Psychiatric Dx: Mood disorder, Alcohol/substance abuse disorders, Cluster B personality disorders or traits (i.e., borderline, antisocial, histrionic & narcissistic), Conduct problems (antisocial behavior, aggression, impulsivity)  Presenting Symptoms: Impulsivity  Family History: Other (Comment) (Unreported)  Precipitants/Stressors: Substance intoxication or withdrawal, Inadequate social supports,  "Legal problems  Change in Treatment: Non-compliant or not receiving treatment  Access to Leth[employer] : No  Step 2: Protective Factors   Protective Factors Internal: Frustration tolerance, Identifies reasons for living  Protective Factors External: Supportive social network or family or friends  Step 3: Suicidal Ideation Intensity  Most Severe Suicidal Ideation Identified: Patient initally presented with suicidal ideation voicing plan to jump off bridge. Patient later reported \"I'm not trying to die\".  How Many Times Have You Had These Thoughts: 2-5 times in a week  When You Have the Thoughts How Long do They Last : Less than 1 hour/some of the time  Could/Can You Stop Thinking About Killing Yourself or Wanting to Die if You Want to: Can control thoughts with little difficulty  Are There Things - Anyone or Anything - That Stopped You From Wanting to Die or Acting on: Uncertain that deterrents stopped you  What Sort of Reasons Did You Have For Thinking About Wanting to Die or Killing Yourself: Equally to get attention, revenge or a reaction from others and to end/stop the pain  Total Score: 13  Step 5: Documentation  Risk Level: Moderate suicide risk    Psychiatric Impression and Plan of Care  Assessment and Plan: Patient, Helena Odonnell, is a 38 year old female with history of schizophrenia versus schizoaffective disorder, bipolar type; bipolar 1, depression, and polysubstance use disorder (cocaine, PCP, alcohol). Patient presented to ED with complaint of suicidal ideation. Patient discussed reason for ED visit stating “I don’t feel safe. I want to die. I don’t have any prescriptions and I don’t have a psych doctor to talk to. People were making fun of me when I came in here. They were laughing at me”. Patient discussed increased life stressors including not liking housing arrangements through [facility] program. Patient reported desire to have less people around living arrangement and not feeling safe in current " apartment. Patient typically presents to EPAT with concerns about housing and historically with present with conditional suicidal ideation related to housing situation. Patient reported having little access to food resources from [employer] program which presented additional concern during EPAT assessment. Patient reported feeling physically unwell and voiced desire to connect with PCP for further care. Patient discussed no acute changes to anxiety, depression, appetite, or sleeping habits. Patient reported sleeping has been disrupted over the last year. Patient discussed having limited access to food in the community. Patient asked about suicidal statements voiced to ED provider. Patient discussed suicidal ideation with plan to jump off a bridge. Patient reported having though to jump off bridge while on a bus. Patient reportedly chose to stop at the hospital in lieu of finding bridge. Patient discussed with EPAT  “I’m not trying to die” but voiced conditions to suicidal ideation. Patient’s C-SSRS scored at moderate risk due to recent suicidal ideation with plan and no intention to act on plan. Patient’s overall lifetime risk of suicidal ideation remains elevated at moderate risk. Patient denied homicidal ideation. Patient reported concern for spider bite with ED provider, which is a typical hallucination patient experiences.  Patient denied experiencing hallucinations during EPAT evaluation. Patient has been seen on multiple occasions in area ED's with similar discussion of hallucinations. Patient will typically present to ED's with some form of substance in system. Patient positive for cocaine with current ED visit. Patient discussed history of use and sobriety. Patient reported living situation was impacting use due to medication non-compliance and access to street drugs. Patient [facility]. Patient requesting to go to inpatient psychiatric hospital to “get myself  together”. Patient reported goal to work on while hospitalized would be to connect with social workers for medications and to apply for different housing program. Patient could accomplish goals with outpatient community resources. Patient discussed treatment non-compliance and typical pattern of hospitalization with no follow up in the community. Patient denied having current outpatient providers for mental health or psychiatry. Patient reported some desire to connect to [facility] but did not seem overtly motivated for outpatient treatment. Patient unable to identify supportive person stating “I don't have a friend in the world”. Patient able to identify reason for living being “I want a new apartment and to go to the inpatient hospital to get myself together”. Patient does not currently meet criteria for inpatient hospitalization due to presentation being at functional baseline and typical of most ED visits. Patient presents as chronic moderate risk of harm to self, current low risk of harm to others, and showing no acute disability related to mental health diagnoses. Patient encouraged to follow up with outpatient resources connected with [employer] [employer], walk into [facility] or [facility], call 9-1-1, call crisis hotline, and return to ED if symptoms persist. Patient recommended for discharge. Plan for care discussed with and approved by Dr. Bill.         Specific Resources Provided to Patient: Patient encouraged to follow up with outpatient resources connected with [employer], walk into [facility] or [facility], call 9-1-1, call crisis hotline, and return to ED if symptoms persist.  CM Notified: -  PHP/IOP Recommended: Not at this time  Specific Information Provided for PHP/IOP: None at this time  Plan Comments: Diagnosis: Unspecified mood disorder and substance use disorder.    Outcome/Disposition  Patient's Perception of Outcome Achieved: Patient voicing desire to come into inpatient  [facility].  Assessment, Recommendations and Risk Level Reviewed with: Dr. Bill  Contact Name: Marcus Odonnell  Contact Number(s): [phone]  Contact Relationship: Brother  EPAT Assessment Completed Date: 06/12/24  EPAT Assessment Completed Time: 1801  Patient Disposition: Home

## 2024-06-12 NOTE — PROGRESS NOTES
I received Helena Odonnell in signout from Ja Nielsen PA-C.  Please see the previous note for all HPI, PE and MDM up to the time of signout at 1500.  This is in addition to the primary documentation.    In brief Helena Odonnell is an 38 y.o. female presenting for suicidal ideation and severe agitation.  The patient's agitation significantly improved after receiving dose of Zyprexa and Versed.  EPAT has been consulted and we are awaiting final recommendations at the time of signout.    Diagnoses as of 06/12/24 1614   Suicidal ideation   Cocaine intoxication with delirium (Multi)        MDM:  The patient was monitored for any changes in vital signs or symptoms and remained hemodynamically stable.  EPAT evaluated the patient and has deemed that the patient is not a harm to herself at this time and is safe for discharge home.  The patient does have an outpatient  and psychiatrist that she is instructed to follow-up with.  The patient was also counseled on cocaine use and is not interested in any further resources on this at this time.  The patient was discharged home in stable condition.    Impression:   -Suicidal ideation  -Cocaine intoxication    Pt Disposition:     Assessment and plan discussed with Dr. Brandon Bill, DO   Emergency Medicine, PGY-1     Disclaimer: This note was dictated by speech recognition. Minor errors in transcription may be present.     Procedures

## 2024-07-26 ENCOUNTER — HOSPITAL ENCOUNTER (EMERGENCY)
Facility: HOSPITAL | Age: 39
Discharge: NURSING, REHAB OR LONG-TERM CARE | End: 2024-07-26
Attending: EMERGENCY MEDICINE
Payer: COMMERCIAL

## 2024-07-26 ENCOUNTER — CLINICAL SUPPORT (OUTPATIENT)
Dept: EMERGENCY MEDICINE | Facility: HOSPITAL | Age: 39
End: 2024-07-26
Payer: COMMERCIAL

## 2024-07-26 ENCOUNTER — APPOINTMENT (OUTPATIENT)
Dept: RADIOLOGY | Facility: HOSPITAL | Age: 39
End: 2024-07-26
Payer: COMMERCIAL

## 2024-07-26 VITALS
SYSTOLIC BLOOD PRESSURE: 126 MMHG | DIASTOLIC BLOOD PRESSURE: 78 MMHG | OXYGEN SATURATION: 98 % | HEART RATE: 75 BPM | BODY MASS INDEX: 25.18 KG/M2 | RESPIRATION RATE: 12 BRPM | HEIGHT: 69 IN | TEMPERATURE: 98.2 F | WEIGHT: 170 LBS

## 2024-07-26 DIAGNOSIS — R45.851 SUICIDAL IDEATION: ICD-10-CM

## 2024-07-26 DIAGNOSIS — S41.111A LACERATION OF RIGHT UPPER EXTREMITY, INITIAL ENCOUNTER: Primary | ICD-10-CM

## 2024-07-26 LAB
ALBUMIN SERPL BCP-MCNC: 4.1 G/DL (ref 3.4–5)
ALP SERPL-CCNC: 62 U/L (ref 33–110)
ALT SERPL W P-5'-P-CCNC: 12 U/L (ref 7–45)
AMPHETAMINES UR QL SCN: ABNORMAL
ANION GAP SERPL CALC-SCNC: 14 MMOL/L (ref 10–20)
APAP SERPL-MCNC: <10 UG/ML
APPEARANCE UR: CLEAR
AST SERPL W P-5'-P-CCNC: 23 U/L (ref 9–39)
ATRIAL RATE: 74 BPM
B-HCG SERPL-ACNC: <3 MIU/ML
BARBITURATES UR QL SCN: ABNORMAL
BASOPHILS # BLD AUTO: 0.04 X10*3/UL (ref 0–0.1)
BASOPHILS NFR BLD AUTO: 0.5 %
BENZODIAZ UR QL SCN: ABNORMAL
BILIRUB SERPL-MCNC: 0.3 MG/DL (ref 0–1.2)
BILIRUB UR STRIP.AUTO-MCNC: NEGATIVE MG/DL
BUN SERPL-MCNC: 11 MG/DL (ref 6–23)
BZE UR QL SCN: ABNORMAL
CALCIUM SERPL-MCNC: 8.9 MG/DL (ref 8.6–10.6)
CANNABINOIDS UR QL SCN: ABNORMAL
CHLORIDE SERPL-SCNC: 103 MMOL/L (ref 98–107)
CO2 SERPL-SCNC: 22 MMOL/L (ref 21–32)
COLOR UR: COLORLESS
CREAT SERPL-MCNC: 0.53 MG/DL (ref 0.5–1.05)
EGFRCR SERPLBLD CKD-EPI 2021: >90 ML/MIN/1.73M*2
EOSINOPHIL # BLD AUTO: 0.09 X10*3/UL (ref 0–0.7)
EOSINOPHIL NFR BLD AUTO: 1.1 %
ERYTHROCYTE [DISTWIDTH] IN BLOOD BY AUTOMATED COUNT: 15 % (ref 11.5–14.5)
ETHANOL SERPL-MCNC: <10 MG/DL
FENTANYL+NORFENTANYL UR QL SCN: ABNORMAL
GLUCOSE BLD MANUAL STRIP-MCNC: 102 MG/DL (ref 74–99)
GLUCOSE SERPL-MCNC: 71 MG/DL (ref 74–99)
GLUCOSE UR STRIP.AUTO-MCNC: NORMAL MG/DL
HCT VFR BLD AUTO: 35.1 % (ref 36–46)
HGB BLD-MCNC: 12 G/DL (ref 12–16)
IMM GRANULOCYTES # BLD AUTO: 0.03 X10*3/UL (ref 0–0.7)
IMM GRANULOCYTES NFR BLD AUTO: 0.4 % (ref 0–0.9)
KETONES UR STRIP.AUTO-MCNC: ABNORMAL MG/DL
LEUKOCYTE ESTERASE UR QL STRIP.AUTO: ABNORMAL
LYMPHOCYTES # BLD AUTO: 2.07 X10*3/UL (ref 1.2–4.8)
LYMPHOCYTES NFR BLD AUTO: 24.3 %
MCH RBC QN AUTO: 30.2 PG (ref 26–34)
MCHC RBC AUTO-ENTMCNC: 34.2 G/DL (ref 32–36)
MCV RBC AUTO: 88 FL (ref 80–100)
METHADONE UR QL SCN: ABNORMAL
MONOCYTES # BLD AUTO: 0.48 X10*3/UL (ref 0.1–1)
MONOCYTES NFR BLD AUTO: 5.6 %
MUCOUS THREADS #/AREA URNS AUTO: ABNORMAL /LPF
NEUTROPHILS # BLD AUTO: 5.81 X10*3/UL (ref 1.2–7.7)
NEUTROPHILS NFR BLD AUTO: 68.1 %
NITRITE UR QL STRIP.AUTO: NEGATIVE
NRBC BLD-RTO: 0 /100 WBCS (ref 0–0)
OPIATES UR QL SCN: ABNORMAL
OXYCODONE+OXYMORPHONE UR QL SCN: ABNORMAL
P AXIS: 60 DEGREES
P OFFSET: 190 MS
P ONSET: 133 MS
PCP UR QL SCN: ABNORMAL
PH UR STRIP.AUTO: 5.5 [PH]
PLATELET # BLD AUTO: 284 X10*3/UL (ref 150–450)
POTASSIUM SERPL-SCNC: 4.1 MMOL/L (ref 3.5–5.3)
PR INTERVAL: 174 MS
PROT SERPL-MCNC: 7.6 G/DL (ref 6.4–8.2)
PROT UR STRIP.AUTO-MCNC: NEGATIVE MG/DL
Q ONSET: 220 MS
QRS COUNT: 13 BEATS
QRS DURATION: 82 MS
QT INTERVAL: 418 MS
QTC CALCULATION(BAZETT): 463 MS
QTC FREDERICIA: 448 MS
R AXIS: 26 DEGREES
RBC # BLD AUTO: 3.98 X10*6/UL (ref 4–5.2)
RBC # UR STRIP.AUTO: ABNORMAL /UL
RBC #/AREA URNS AUTO: >20 /HPF
SALICYLATES SERPL-MCNC: <3 MG/DL
SODIUM SERPL-SCNC: 135 MMOL/L (ref 136–145)
SP GR UR STRIP.AUTO: 1.01
SQUAMOUS #/AREA URNS AUTO: ABNORMAL /HPF
T AXIS: 38 DEGREES
T OFFSET: 429 MS
UROBILINOGEN UR STRIP.AUTO-MCNC: NORMAL MG/DL
VENTRICULAR RATE: 74 BPM
WBC # BLD AUTO: 8.5 X10*3/UL (ref 4.4–11.3)
WBC #/AREA URNS AUTO: >50 /HPF

## 2024-07-26 PROCEDURE — 81001 URINALYSIS AUTO W/SCOPE: CPT

## 2024-07-26 PROCEDURE — 73090 X-RAY EXAM OF FOREARM: CPT | Mod: RIGHT SIDE | Performed by: RADIOLOGY

## 2024-07-26 PROCEDURE — 99285 EMERGENCY DEPT VISIT HI MDM: CPT | Performed by: EMERGENCY MEDICINE

## 2024-07-26 PROCEDURE — 80307 DRUG TEST PRSMV CHEM ANLYZR: CPT

## 2024-07-26 PROCEDURE — 80143 DRUG ASSAY ACETAMINOPHEN: CPT

## 2024-07-26 PROCEDURE — 82947 ASSAY GLUCOSE BLOOD QUANT: CPT

## 2024-07-26 PROCEDURE — 84075 ASSAY ALKALINE PHOSPHATASE: CPT

## 2024-07-26 PROCEDURE — 99285 EMERGENCY DEPT VISIT HI MDM: CPT | Mod: 25

## 2024-07-26 PROCEDURE — 12001 RPR S/N/AX/GEN/TRNK 2.5CM/<: CPT | Performed by: EMERGENCY MEDICINE

## 2024-07-26 PROCEDURE — 99255 IP/OBS CONSLTJ NEW/EST HI 80: CPT

## 2024-07-26 PROCEDURE — 84702 CHORIONIC GONADOTROPIN TEST: CPT

## 2024-07-26 PROCEDURE — 85025 COMPLETE CBC W/AUTO DIFF WBC: CPT

## 2024-07-26 PROCEDURE — 93005 ELECTROCARDIOGRAM TRACING: CPT

## 2024-07-26 PROCEDURE — 36415 COLL VENOUS BLD VENIPUNCTURE: CPT

## 2024-07-26 PROCEDURE — 73090 X-RAY EXAM OF FOREARM: CPT | Mod: RT

## 2024-07-26 RX ORDER — IBUPROFEN 400 MG/1
400 TABLET ORAL ONCE
Status: DISCONTINUED | OUTPATIENT
Start: 2024-07-26 | End: 2024-07-26 | Stop reason: HOSPADM

## 2024-07-26 SDOH — HEALTH STABILITY: MENTAL HEALTH: DELUSIONS: OTHER (COMMENT)

## 2024-07-26 SDOH — HEALTH STABILITY: MENTAL HEALTH: HAVE YOU BEEN THINKING ABOUT HOW YOU MIGHT DO THIS?: NO

## 2024-07-26 SDOH — HEALTH STABILITY: MENTAL HEALTH: HAVE YOU EVER DONE ANYTHING, STARTED TO DO ANYTHING, OR PREPARED TO DO ANYTHING TO END YOUR LIFE?: YES

## 2024-07-26 SDOH — HEALTH STABILITY: MENTAL HEALTH: SUICIDE ASSESSMENT: ADULT (C-SSRS)

## 2024-07-26 SDOH — HEALTH STABILITY: MENTAL HEALTH: HAVE YOU WISHED YOU WERE DEAD OR WISHED YOU COULD GO TO SLEEP AND NOT WAKE UP?: YES

## 2024-07-26 SDOH — HEALTH STABILITY: MENTAL HEALTH

## 2024-07-26 SDOH — HEALTH STABILITY: MENTAL HEALTH: HAVE YOU HAD THESE THOUGHTS AND HAD SOME INTENTION OF ACTING ON THEM?: YES

## 2024-07-26 SDOH — HEALTH STABILITY: MENTAL HEALTH: WAS THIS WITHIN THE PAST THREE MONTHS?: NO

## 2024-07-26 SDOH — HEALTH STABILITY: MENTAL HEALTH: BEHAVIORS/MOOD: FLAT AFFECT;WITHDRAWN

## 2024-07-26 SDOH — HEALTH STABILITY: MENTAL HEALTH: CONTENT: UNREMARKABLE

## 2024-07-26 SDOH — HEALTH STABILITY: MENTAL HEALTH: HAVE YOU ACTUALLY HAD ANY THOUGHTS OF KILLING YOURSELF?: YES

## 2024-07-26 ASSESSMENT — PAIN SCALES - GENERAL
PAINLEVEL_OUTOF10: 8
PAINLEVEL_OUTOF10: 0 - NO PAIN
PAINLEVEL_OUTOF10: 1

## 2024-07-26 ASSESSMENT — PAIN - FUNCTIONAL ASSESSMENT: PAIN_FUNCTIONAL_ASSESSMENT: 0-10

## 2024-07-26 ASSESSMENT — LIFESTYLE VARIABLES
HAVE YOU EVER FELT YOU SHOULD CUT DOWN ON YOUR DRINKING: NO
EVER HAD A DRINK FIRST THING IN THE MORNING TO STEADY YOUR NERVES TO GET RID OF A HANGOVER: NO
HAVE PEOPLE ANNOYED YOU BY CRITICIZING YOUR DRINKING: NO
EVER FELT BAD OR GUILTY ABOUT YOUR DRINKING: NO
TOTAL SCORE: 0

## 2024-07-26 ASSESSMENT — COLUMBIA-SUICIDE SEVERITY RATING SCALE - C-SSRS
2. HAVE YOU ACTUALLY HAD ANY THOUGHTS OF KILLING YOURSELF?: NO
1. IN THE PAST MONTH, HAVE YOU WISHED YOU WERE DEAD OR WISHED YOU COULD GO TO SLEEP AND NOT WAKE UP?: NO
6. HAVE YOU EVER DONE ANYTHING, STARTED TO DO ANYTHING, OR PREPARED TO DO ANYTHING TO END YOUR LIFE?: NO
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
## 2024-07-26 NOTE — ED PROCEDURE NOTE
Procedure  Laceration Repair    Performed by: Teresita Nick MD  Authorized by: Hayder Bishop MD    Consent:     Consent obtained:  Verbal    Consent given by:  Patient    Risks, benefits, and alternatives were discussed: yes      Alternatives discussed:  No treatment  Universal protocol:     Procedure explained and questions answered to patient or proxy's satisfaction: yes      Imaging studies available: yes      Patient identity confirmed:  Verbally with patient and arm band  Anesthesia:     Anesthesia method:  None  Laceration details:     Location:  Shoulder/arm    Shoulder/arm location:  R lower arm    Length (cm):  1    Depth (mm):  1  Exploration:     Contaminated: no    Treatment:     Area cleansed with:  Saline    Amount of cleaning:  Standard    Irrigation solution:  Sterile saline    Debridement:  None    Undermining:  None    Scar revision: no    Skin repair:     Repair method:  Tissue adhesive  Approximation:     Approximation:  Close  Repair type:     Repair type:  Simple  Post-procedure details:     Dressing:  Open (no dressing)    Procedure completion:  Tolerated             Teresita Nick MD  07/26/24 1049
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
## 2024-11-14 ENCOUNTER — HOSPITAL ENCOUNTER (EMERGENCY)
Dept: CARDIOLOGY | Facility: HOSPITAL | Age: 39
Discharge: HOME | End: 2024-11-14
Payer: COMMERCIAL

## 2024-11-14 ENCOUNTER — HOSPITAL ENCOUNTER (EMERGENCY)
Facility: HOSPITAL | Age: 39
Discharge: HOME | End: 2024-11-14
Attending: STUDENT IN AN ORGANIZED HEALTH CARE EDUCATION/TRAINING PROGRAM
Payer: COMMERCIAL

## 2024-11-14 VITALS
RESPIRATION RATE: 15 BRPM | HEIGHT: 69 IN | OXYGEN SATURATION: 99 % | WEIGHT: 220 LBS | HEART RATE: 80 BPM | TEMPERATURE: 96.6 F | BODY MASS INDEX: 32.58 KG/M2 | DIASTOLIC BLOOD PRESSURE: 79 MMHG | SYSTOLIC BLOOD PRESSURE: 122 MMHG

## 2024-11-14 DIAGNOSIS — R07.9 CHEST PAIN: ICD-10-CM

## 2024-11-14 DIAGNOSIS — R07.9 CHEST PAIN, UNSPECIFIED TYPE: Primary | ICD-10-CM

## 2024-11-14 LAB
ANION GAP SERPL CALC-SCNC: 9 MMOL/L (ref 10–20)
APTT PPP: 33 SECONDS (ref 27–38)
BASOPHILS # BLD AUTO: 0.03 X10*3/UL (ref 0–0.1)
BASOPHILS NFR BLD AUTO: 0.4 %
BUN SERPL-MCNC: 10 MG/DL (ref 6–23)
CALCIUM SERPL-MCNC: 8 MG/DL (ref 8.6–10.3)
CARDIAC TROPONIN I PNL SERPL HS: <3 NG/L (ref 0–13)
CARDIAC TROPONIN I PNL SERPL HS: <3 NG/L (ref 0–13)
CHLORIDE SERPL-SCNC: 107 MMOL/L (ref 98–107)
CO2 SERPL-SCNC: 26 MMOL/L (ref 21–32)
CREAT SERPL-MCNC: 0.57 MG/DL (ref 0.5–1.05)
EGFRCR SERPLBLD CKD-EPI 2021: >90 ML/MIN/1.73M*2
EOSINOPHIL # BLD AUTO: 0.04 X10*3/UL (ref 0–0.7)
EOSINOPHIL NFR BLD AUTO: 0.6 %
ERYTHROCYTE [DISTWIDTH] IN BLOOD BY AUTOMATED COUNT: 14.1 % (ref 11.5–14.5)
GLUCOSE SERPL-MCNC: 100 MG/DL (ref 74–99)
HCT VFR BLD AUTO: 33.7 % (ref 36–46)
HGB BLD-MCNC: 10.8 G/DL (ref 12–16)
IMM GRANULOCYTES # BLD AUTO: 0.03 X10*3/UL (ref 0–0.7)
IMM GRANULOCYTES NFR BLD AUTO: 0.4 % (ref 0–0.9)
INR PPP: 1.1 (ref 0.9–1.1)
LYMPHOCYTES # BLD AUTO: 1.43 X10*3/UL (ref 1.2–4.8)
LYMPHOCYTES NFR BLD AUTO: 20.2 %
MAGNESIUM SERPL-MCNC: 1.78 MG/DL (ref 1.6–2.4)
MCH RBC QN AUTO: 30.6 PG (ref 26–34)
MCHC RBC AUTO-ENTMCNC: 32 G/DL (ref 32–36)
MCV RBC AUTO: 96 FL (ref 80–100)
MONOCYTES # BLD AUTO: 0.51 X10*3/UL (ref 0.1–1)
MONOCYTES NFR BLD AUTO: 7.2 %
NEUTROPHILS # BLD AUTO: 5.03 X10*3/UL (ref 1.2–7.7)
NEUTROPHILS NFR BLD AUTO: 71.2 %
NRBC BLD-RTO: 0 /100 WBCS (ref 0–0)
PLATELET # BLD AUTO: 235 X10*3/UL (ref 150–450)
POTASSIUM SERPL-SCNC: 3.8 MMOL/L (ref 3.5–5.3)
PROTHROMBIN TIME: 12.1 SECONDS (ref 9.8–12.8)
RBC # BLD AUTO: 3.53 X10*6/UL (ref 4–5.2)
SODIUM SERPL-SCNC: 138 MMOL/L (ref 136–145)
WBC # BLD AUTO: 7.1 X10*3/UL (ref 4.4–11.3)

## 2024-11-14 PROCEDURE — 85730 THROMBOPLASTIN TIME PARTIAL: CPT | Performed by: STUDENT IN AN ORGANIZED HEALTH CARE EDUCATION/TRAINING PROGRAM

## 2024-11-14 PROCEDURE — 84484 ASSAY OF TROPONIN QUANT: CPT | Performed by: STUDENT IN AN ORGANIZED HEALTH CARE EDUCATION/TRAINING PROGRAM

## 2024-11-14 PROCEDURE — 85610 PROTHROMBIN TIME: CPT | Performed by: STUDENT IN AN ORGANIZED HEALTH CARE EDUCATION/TRAINING PROGRAM

## 2024-11-14 PROCEDURE — 2500000005 HC RX 250 GENERAL PHARMACY W/O HCPCS: Performed by: STUDENT IN AN ORGANIZED HEALTH CARE EDUCATION/TRAINING PROGRAM

## 2024-11-14 PROCEDURE — 36415 COLL VENOUS BLD VENIPUNCTURE: CPT | Performed by: STUDENT IN AN ORGANIZED HEALTH CARE EDUCATION/TRAINING PROGRAM

## 2024-11-14 PROCEDURE — 99283 EMERGENCY DEPT VISIT LOW MDM: CPT

## 2024-11-14 PROCEDURE — 93005 ELECTROCARDIOGRAM TRACING: CPT

## 2024-11-14 PROCEDURE — 80048 BASIC METABOLIC PNL TOTAL CA: CPT | Performed by: STUDENT IN AN ORGANIZED HEALTH CARE EDUCATION/TRAINING PROGRAM

## 2024-11-14 PROCEDURE — 83735 ASSAY OF MAGNESIUM: CPT | Performed by: STUDENT IN AN ORGANIZED HEALTH CARE EDUCATION/TRAINING PROGRAM

## 2024-11-14 PROCEDURE — 85025 COMPLETE CBC W/AUTO DIFF WBC: CPT | Performed by: STUDENT IN AN ORGANIZED HEALTH CARE EDUCATION/TRAINING PROGRAM

## 2024-11-14 RX ORDER — TETRACAINE HYDROCHLORIDE 5 MG/ML
1 SOLUTION OPHTHALMIC ONCE
Status: COMPLETED | OUTPATIENT
Start: 2024-11-14 | End: 2024-11-14

## 2024-11-14 ASSESSMENT — COLUMBIA-SUICIDE SEVERITY RATING SCALE - C-SSRS
6. HAVE YOU EVER DONE ANYTHING, STARTED TO DO ANYTHING, OR PREPARED TO DO ANYTHING TO END YOUR LIFE?: NO
1. IN THE PAST MONTH, HAVE YOU WISHED YOU WERE DEAD OR WISHED YOU COULD GO TO SLEEP AND NOT WAKE UP?: NO
2. HAVE YOU ACTUALLY HAD ANY THOUGHTS OF KILLING YOURSELF?: NO

## 2024-11-14 ASSESSMENT — HEART SCORE
HEART SCORE: 1
RISK FACTORS: 1-2 RISK FACTORS
TROPONIN: LESS THAN OR EQUAL TO NORMAL LIMIT
AGE: <45
HISTORY: SLIGHTLY SUSPICIOUS
ECG: NORMAL

## 2024-11-14 ASSESSMENT — VISUAL ACUITY
OS: 20/20
OD: 20/20
OD: 20/20
OU: 20/20
OU: 20/20
OU: 1
OS: 20/20

## 2024-11-14 ASSESSMENT — TONOMETRY: OS_IOP_MMHG: 24

## 2024-11-14 NOTE — ED TRIAGE NOTES
Patient KAYLEE from [facility] for CP. Patients states she was in class and when she began seeing flashing light having chest pressure on her right side. Patient is also endorsing Right flank pain. She states she has been having nausea, denies SOB, but endorses pressure on inhalation. PMH: DM, Clots and HTN.

## 2024-11-15 LAB
ATRIAL RATE: 81 BPM
P AXIS: 41 DEGREES
PR INTERVAL: 164 MS
Q ONSET: 252 MS
QRS COUNT: 13 BEATS
QRS DURATION: 80 MS
QT INTERVAL: 379 MS
QTC CALCULATION(BAZETT): 440 MS
QTC FREDERICIA: 418 MS
R AXIS: 8 DEGREES
T AXIS: 1 DEGREES
T OFFSET: 442 MS
VENTRICULAR RATE: 81 BPM

## 2024-11-15 PROCEDURE — 93005 ELECTROCARDIOGRAM TRACING: CPT

## 2024-11-15 NOTE — ED PROVIDER NOTES
"HPI   Chief Complaint   Patient presents with    Chest Pain       The patient is a 39-year-old female with a past medical history as below presents today for evaluation of chest pain.  Started while she was at group.  Nonradiating.  No history of MI or CVA.  No missed doses of her blood thinner.  She also states that she had some floaters in her left vision.  She states this has resolved now.              Patient History   No past medical history on file.  No past surgical history on file.  No family history on file.  Social History     Tobacco Use    Smoking status: Every Day     Types: Cigarettes    Smokeless tobacco: Not on file   Vaping Use    Vaping status: Never Used   Substance Use Topics    Alcohol use: Yes     Comment: occasionally    Drug use: Yes     Types: \"Crack\" cocaine, Marijuana       Physical Exam   ED Triage Vitals [11/14/24 1200]   Temperature Heart Rate Respirations BP   35.9 °C (96.6 °F) 77 12 122/77      Pulse Ox Temp Source Heart Rate Source Patient Position   99 % Tympanic Monitor Lying      BP Location FiO2 (%)     Right arm --       Physical Exam  Vitals and nursing note reviewed.   Constitutional:       Appearance: Normal appearance.   HENT:      Head: Normocephalic and atraumatic.      Nose: Nose normal.      Mouth/Throat:      Mouth: Mucous membranes are moist.   Eyes:      General: Lids are everted, no foreign bodies appreciated. Vision grossly intact.         Right eye: No foreign body or discharge.         Left eye: No foreign body or discharge.      Intraocular pressure: Left eye pressure is 24 mmHg.      Conjunctiva/sclera: Conjunctivae normal.      Right eye: Right conjunctiva is not injected. No chemosis, exudate or hemorrhage.     Left eye: Left conjunctiva is not injected. No chemosis, exudate or hemorrhage.  Cardiovascular:      Rate and Rhythm: Normal rate and regular rhythm.      Pulses: Normal pulses.      Heart sounds: Normal heart sounds.   Pulmonary:      Effort: " Pulmonary effort is normal.      Breath sounds: Normal breath sounds.   Abdominal:      General: Abdomen is flat.      Palpations: Abdomen is soft.      Tenderness: There is no abdominal tenderness. There is no guarding or rebound.   Musculoskeletal:         General: No deformity.   Neurological:      General: No focal deficit present.      Mental Status: She is alert and oriented to person, place, and time. Mental status is at baseline.   Psychiatric:         Mood and Affect: Mood normal.         Behavior: Behavior normal.           ED Course & MDM   ED Course as of 11/15/24 1113   Thu Nov 14, 2024   1214 Electrocardiogram, 12-lead  Sinus rhythm rate 81 as interpreted by me [SE]      ED Course User Index  [SE] Mitch Guadarrama MD         Diagnoses as of 11/15/24 1113   Chest pain, unspecified type                 No data recorded     [place] Coma Scale Score: 15 (11/14/24 1213 : Ching Huffman RN) HEART Score: 1 (11/14/24 1943 : Mitch Guadarrama MD)                         Medical Decision Making  Presents for evaluation of chest pain, visual changes left eye.  While here symptoms resolved.  She had negative troponin x 2.  She was minimally cooperative with eye examination and declined further testing after normal pressures in the left eye.  She will follow-up as an outpatient with ophthalmology, primary care.  All questions answered return precautions given.  Heart score calculated low risk, appropriate for outpatient follow-up.    Amount and/or Complexity of Data Reviewed  External Data Reviewed: labs and notes.  Labs: ordered.  ECG/medicine tests: ordered and independent interpretation performed.    Risk  Decision regarding hospitalization.        Procedure  Procedures     Mitch Guadarrama MD  11/15/24 1113

## 2024-12-13 ENCOUNTER — HOSPITAL ENCOUNTER (OUTPATIENT)
Dept: CARDIOLOGY | Facility: HOSPITAL | Age: 39
Discharge: HOME | End: 2024-12-13
Payer: COMMERCIAL

## 2024-12-13 DIAGNOSIS — I49.8 OTHER SPECIFIED CARDIAC ARRHYTHMIAS: ICD-10-CM

## 2024-12-13 PROCEDURE — 93005 ELECTROCARDIOGRAM TRACING: CPT

## 2024-12-16 LAB
ATRIAL RATE: 74 BPM
P AXIS: 42 DEGREES
P OFFSET: 200 MS
P ONSET: 143 MS
PR INTERVAL: 160 MS
Q ONSET: 223 MS
QRS COUNT: 13 BEATS
QRS DURATION: 72 MS
QT INTERVAL: 404 MS
QTC CALCULATION(BAZETT): 448 MS
QTC FREDERICIA: 433 MS
R AXIS: 19 DEGREES
T AXIS: 22 DEGREES
T OFFSET: 425 MS
VENTRICULAR RATE: 74 BPM

## 2025-06-24 ENCOUNTER — HOSPITAL ENCOUNTER (EMERGENCY)
Facility: HOSPITAL | Age: 40
Discharge: HOME | End: 2025-06-25
Attending: EMERGENCY MEDICINE
Payer: COMMERCIAL

## 2025-06-24 ENCOUNTER — APPOINTMENT (OUTPATIENT)
Dept: RADIOLOGY | Facility: HOSPITAL | Age: 40
End: 2025-06-24
Payer: COMMERCIAL

## 2025-06-24 ENCOUNTER — CLINICAL SUPPORT (OUTPATIENT)
Dept: EMERGENCY MEDICINE | Facility: HOSPITAL | Age: 40
End: 2025-06-24
Payer: COMMERCIAL

## 2025-06-24 DIAGNOSIS — T43.502A: Primary | ICD-10-CM

## 2025-06-24 DIAGNOSIS — T14.91XA SUICIDE ATTEMPT (MULTI): ICD-10-CM

## 2025-06-24 PROBLEM — F31.9 BIPOLAR AFFECTIVE DISORDER (MULTI): Status: ACTIVE | Noted: 2017-01-09

## 2025-06-24 PROBLEM — F43.10 POSTTRAUMATIC STRESS DISORDER: Status: ACTIVE | Noted: 2017-09-13

## 2025-06-24 PROBLEM — I82.4Z2 DVT, LOWER EXTREMITY, DISTAL, ACUTE, LEFT: Status: ACTIVE | Noted: 2023-03-06

## 2025-06-24 PROBLEM — E78.5 HYPERLIPIDEMIA: Status: ACTIVE | Noted: 2017-01-11

## 2025-06-24 PROBLEM — F10.10 ALCOHOL ABUSE: Status: ACTIVE | Noted: 2023-03-02

## 2025-06-24 PROBLEM — F39 MOOD DISORDER: Status: ACTIVE | Noted: 2024-03-07

## 2025-06-24 PROBLEM — G43.919 INTRACTABLE MIGRAINE WITHOUT STATUS MIGRAINOSUS: Status: ACTIVE | Noted: 2017-01-09

## 2025-06-24 PROBLEM — F25.9 SCHIZOAFFECTIVE DISORDER (MULTI): Status: ACTIVE | Noted: 2017-09-13

## 2025-06-24 PROBLEM — A53.9 SYPHILIS: Status: ACTIVE | Noted: 2023-10-18

## 2025-06-24 PROBLEM — F33.2 MAJOR DEPRESSIVE DISORDER, RECURRENT SEVERE WITHOUT PSYCHOTIC FEATURES (MULTI): Status: ACTIVE | Noted: 2023-03-02

## 2025-06-24 PROBLEM — R46.89 BEHAVIOR PROBLEM: Status: ACTIVE | Noted: 2025-06-24

## 2025-06-24 PROBLEM — F19.10 POLYSUBSTANCE ABUSE: Status: ACTIVE | Noted: 2023-10-16

## 2025-06-24 PROBLEM — E44.0 MALNUTRITION OF MODERATE DEGREE (MULTI): Status: ACTIVE | Noted: 2023-03-06

## 2025-06-24 PROBLEM — K21.9 GASTROESOPHAGEAL REFLUX DISEASE: Status: ACTIVE | Noted: 2017-01-09

## 2025-06-24 PROBLEM — Z86.2 HISTORY OF IMMUNE DISORDER: Status: ACTIVE | Noted: 2017-01-09

## 2025-06-24 LAB
ALBUMIN SERPL BCP-MCNC: 4.1 G/DL (ref 3.4–5)
ALP SERPL-CCNC: 72 U/L (ref 33–110)
ALT SERPL W P-5'-P-CCNC: 12 U/L (ref 7–45)
AMPHETAMINES UR QL SCN: ABNORMAL
ANION GAP SERPL CALC-SCNC: 12 MMOL/L (ref 10–20)
APAP SERPL-MCNC: <10 UG/ML (ref ?–30)
APPEARANCE UR: ABNORMAL
APTT PPP: 23 SECONDS (ref 26–36)
AST SERPL W P-5'-P-CCNC: 24 U/L (ref 9–39)
ATRIAL RATE: 83 BPM
ATRIAL RATE: 91 BPM
BARBITURATES UR QL SCN: ABNORMAL
BASOPHILS # BLD AUTO: 0.06 X10*3/UL (ref 0–0.1)
BASOPHILS NFR BLD AUTO: 0.6 %
BENZODIAZ UR QL SCN: ABNORMAL
BILIRUB SERPL-MCNC: 0.6 MG/DL (ref 0–1.2)
BILIRUB UR STRIP.AUTO-MCNC: NEGATIVE MG/DL
BUN SERPL-MCNC: 11 MG/DL (ref 6–23)
BZE UR QL SCN: ABNORMAL
CALCIUM SERPL-MCNC: 9.2 MG/DL (ref 8.6–10.6)
CANNABINOIDS UR QL SCN: ABNORMAL
CARDIAC TROPONIN I PNL SERPL HS: <3 NG/L (ref 0–34)
CHLORIDE SERPL-SCNC: 102 MMOL/L (ref 98–107)
CK SERPL-CCNC: 392 U/L (ref 0–215)
CO2 SERPL-SCNC: 24 MMOL/L (ref 21–32)
COLOR UR: YELLOW
CREAT SERPL-MCNC: 0.55 MG/DL (ref 0.5–1.05)
EGFRCR SERPLBLD CKD-EPI 2021: >90 ML/MIN/1.73M*2
EOSINOPHIL # BLD AUTO: 0.04 X10*3/UL (ref 0–0.7)
EOSINOPHIL NFR BLD AUTO: 0.4 %
ERYTHROCYTE [DISTWIDTH] IN BLOOD BY AUTOMATED COUNT: 16.6 % (ref 11.5–14.5)
ETHANOL SERPL-MCNC: <10 MG/DL
FENTANYL+NORFENTANYL UR QL SCN: ABNORMAL
GLUCOSE BLD MANUAL STRIP-MCNC: 107 MG/DL (ref 74–99)
GLUCOSE SERPL-MCNC: 103 MG/DL (ref 74–99)
GLUCOSE UR STRIP.AUTO-MCNC: NORMAL MG/DL
HCT VFR BLD AUTO: 30.1 % (ref 36–46)
HGB BLD-MCNC: 9.9 G/DL (ref 12–16)
IMM GRANULOCYTES # BLD AUTO: 0.04 X10*3/UL (ref 0–0.7)
IMM GRANULOCYTES NFR BLD AUTO: 0.4 % (ref 0–0.9)
INR PPP: 0.9 (ref 0.9–1.1)
KETONES UR STRIP.AUTO-MCNC: ABNORMAL MG/DL
LEUKOCYTE ESTERASE UR QL STRIP.AUTO: NEGATIVE
LIPASE SERPL-CCNC: 12 U/L (ref 9–82)
LYMPHOCYTES # BLD AUTO: 1.83 X10*3/UL (ref 1.2–4.8)
LYMPHOCYTES NFR BLD AUTO: 19.6 %
MAGNESIUM SERPL-MCNC: 2.18 MG/DL (ref 1.6–2.4)
MCH RBC QN AUTO: 27.2 PG (ref 26–34)
MCHC RBC AUTO-ENTMCNC: 32.9 G/DL (ref 32–36)
MCV RBC AUTO: 83 FL (ref 80–100)
METHADONE UR QL SCN: ABNORMAL
MONOCYTES # BLD AUTO: 0.62 X10*3/UL (ref 0.1–1)
MONOCYTES NFR BLD AUTO: 6.7 %
NEUTROPHILS # BLD AUTO: 6.73 X10*3/UL (ref 1.2–7.7)
NEUTROPHILS NFR BLD AUTO: 72.3 %
NITRITE UR QL STRIP.AUTO: NEGATIVE
NRBC BLD-RTO: 0 /100 WBCS (ref 0–0)
OPIATES UR QL SCN: ABNORMAL
OXYCODONE+OXYMORPHONE UR QL SCN: ABNORMAL
P AXIS: 49 DEGREES
P AXIS: 52 DEGREES
P OFFSET: 200 MS
P OFFSET: 202 MS
P ONSET: 138 MS
P ONSET: 149 MS
PCP UR QL SCN: ABNORMAL
PH UR STRIP.AUTO: 5.5 [PH]
PLATELET # BLD AUTO: 357 X10*3/UL (ref 150–450)
POTASSIUM SERPL-SCNC: 3.7 MMOL/L (ref 3.5–5.3)
PR INTERVAL: 152 MS
PR INTERVAL: 176 MS
PREGNANCY TEST URINE, POC: NEGATIVE
PROT SERPL-MCNC: 7.7 G/DL (ref 6.4–8.2)
PROT UR STRIP.AUTO-MCNC: NEGATIVE MG/DL
PROTHROMBIN TIME: 10.4 SECONDS (ref 9.8–12.4)
Q ONSET: 225 MS
Q ONSET: 226 MS
QRS COUNT: 14 BEATS
QRS COUNT: 16 BEATS
QRS DURATION: 70 MS
QRS DURATION: 70 MS
QT INTERVAL: 368 MS
QT INTERVAL: 378 MS
QTC CALCULATION(BAZETT): 444 MS
QTC CALCULATION(BAZETT): 452 MS
QTC FREDERICIA: 421 MS
QTC FREDERICIA: 423 MS
R AXIS: 24 DEGREES
R AXIS: 7 DEGREES
RBC # BLD AUTO: 3.64 X10*6/UL (ref 4–5.2)
RBC # UR STRIP.AUTO: NEGATIVE MG/DL
SALICYLATES SERPL-MCNC: <3 MG/DL (ref ?–20)
SODIUM SERPL-SCNC: 134 MMOL/L (ref 136–145)
SP GR UR STRIP.AUTO: 1.02
T AXIS: 20 DEGREES
T AXIS: 27 DEGREES
T OFFSET: 409 MS
T OFFSET: 415 MS
UROBILINOGEN UR STRIP.AUTO-MCNC: NORMAL MG/DL
VENTRICULAR RATE: 83 BPM
VENTRICULAR RATE: 91 BPM
WBC # BLD AUTO: 9.3 X10*3/UL (ref 4.4–11.3)

## 2025-06-24 PROCEDURE — 93005 ELECTROCARDIOGRAM TRACING: CPT

## 2025-06-24 PROCEDURE — 84484 ASSAY OF TROPONIN QUANT: CPT

## 2025-06-24 PROCEDURE — 74176 CT ABD & PELVIS W/O CONTRAST: CPT | Performed by: STUDENT IN AN ORGANIZED HEALTH CARE EDUCATION/TRAINING PROGRAM

## 2025-06-24 PROCEDURE — 85025 COMPLETE CBC W/AUTO DIFF WBC: CPT

## 2025-06-24 PROCEDURE — 96361 HYDRATE IV INFUSION ADD-ON: CPT

## 2025-06-24 PROCEDURE — 96374 THER/PROPH/DIAG INJ IV PUSH: CPT

## 2025-06-24 PROCEDURE — 81003 URINALYSIS AUTO W/O SCOPE: CPT | Mod: 59

## 2025-06-24 PROCEDURE — 82550 ASSAY OF CK (CPK): CPT | Performed by: EMERGENCY MEDICINE

## 2025-06-24 PROCEDURE — 83690 ASSAY OF LIPASE: CPT | Performed by: EMERGENCY MEDICINE

## 2025-06-24 PROCEDURE — 82947 ASSAY GLUCOSE BLOOD QUANT: CPT | Mod: 59

## 2025-06-24 PROCEDURE — 72125 CT NECK SPINE W/O DYE: CPT | Performed by: STUDENT IN AN ORGANIZED HEALTH CARE EDUCATION/TRAINING PROGRAM

## 2025-06-24 PROCEDURE — 85730 THROMBOPLASTIN TIME PARTIAL: CPT

## 2025-06-24 PROCEDURE — 36415 COLL VENOUS BLD VENIPUNCTURE: CPT

## 2025-06-24 PROCEDURE — 99285 EMERGENCY DEPT VISIT HI MDM: CPT | Mod: 25 | Performed by: EMERGENCY MEDICINE

## 2025-06-24 PROCEDURE — 2500000001 HC RX 250 WO HCPCS SELF ADMINISTERED DRUGS (ALT 637 FOR MEDICARE OP): Mod: SE | Performed by: EMERGENCY MEDICINE

## 2025-06-24 PROCEDURE — 80053 COMPREHEN METABOLIC PANEL: CPT

## 2025-06-24 PROCEDURE — 83735 ASSAY OF MAGNESIUM: CPT | Performed by: EMERGENCY MEDICINE

## 2025-06-24 PROCEDURE — 70450 CT HEAD/BRAIN W/O DYE: CPT | Performed by: STUDENT IN AN ORGANIZED HEALTH CARE EDUCATION/TRAINING PROGRAM

## 2025-06-24 PROCEDURE — 71250 CT THORAX DX C-: CPT | Performed by: STUDENT IN AN ORGANIZED HEALTH CARE EDUCATION/TRAINING PROGRAM

## 2025-06-24 PROCEDURE — 80320 DRUG SCREEN QUANTALCOHOLS: CPT

## 2025-06-24 PROCEDURE — 2500000004 HC RX 250 GENERAL PHARMACY W/ HCPCS (ALT 636 FOR OP/ED): Mod: SE | Performed by: EMERGENCY MEDICINE

## 2025-06-24 PROCEDURE — 73552 X-RAY EXAM OF FEMUR 2/>: CPT | Mod: LT

## 2025-06-24 PROCEDURE — 96375 TX/PRO/DX INJ NEW DRUG ADDON: CPT

## 2025-06-24 PROCEDURE — 74176 CT ABD & PELVIS W/O CONTRAST: CPT

## 2025-06-24 PROCEDURE — 73502 X-RAY EXAM HIP UNI 2-3 VIEWS: CPT | Mod: LT

## 2025-06-24 PROCEDURE — 81025 URINE PREGNANCY TEST: CPT

## 2025-06-24 PROCEDURE — 73552 X-RAY EXAM OF FEMUR 2/>: CPT | Mod: LEFT SIDE | Performed by: RADIOLOGY

## 2025-06-24 PROCEDURE — 73502 X-RAY EXAM HIP UNI 2-3 VIEWS: CPT | Mod: LEFT SIDE | Performed by: RADIOLOGY

## 2025-06-24 PROCEDURE — 71046 X-RAY EXAM CHEST 2 VIEWS: CPT | Performed by: RADIOLOGY

## 2025-06-24 PROCEDURE — 70450 CT HEAD/BRAIN W/O DYE: CPT

## 2025-06-24 PROCEDURE — 72125 CT NECK SPINE W/O DYE: CPT

## 2025-06-24 PROCEDURE — 71046 X-RAY EXAM CHEST 2 VIEWS: CPT

## 2025-06-24 PROCEDURE — 80307 DRUG TEST PRSMV CHEM ANLYZR: CPT

## 2025-06-24 RX ORDER — MULTIVIT-MIN/IRON FUM/FOLIC AC 7.5 MG-4
1 TABLET ORAL ONCE
Status: COMPLETED | OUTPATIENT
Start: 2025-06-24 | End: 2025-06-24

## 2025-06-24 RX ORDER — FAMOTIDINE 10 MG/ML
20 INJECTION, SOLUTION INTRAVENOUS ONCE
Status: COMPLETED | OUTPATIENT
Start: 2025-06-24 | End: 2025-06-24

## 2025-06-24 RX ORDER — FOLIC ACID 1 MG/1
1 TABLET ORAL ONCE
Status: COMPLETED | OUTPATIENT
Start: 2025-06-24 | End: 2025-06-24

## 2025-06-24 RX ORDER — ONDANSETRON 4 MG/1
4 TABLET, ORALLY DISINTEGRATING ORAL ONCE
Status: DISCONTINUED | OUTPATIENT
Start: 2025-06-24 | End: 2025-06-25 | Stop reason: HOSPADM

## 2025-06-24 RX ORDER — THIAMINE HYDROCHLORIDE 100 MG/ML
100 INJECTION, SOLUTION INTRAMUSCULAR; INTRAVENOUS ONCE
Status: COMPLETED | OUTPATIENT
Start: 2025-06-24 | End: 2025-06-24

## 2025-06-24 RX ADMIN — Medication 1 TABLET: at 15:05

## 2025-06-24 RX ADMIN — FAMOTIDINE 20 MG: 10 INJECTION INTRAVENOUS at 15:04

## 2025-06-24 RX ADMIN — SODIUM CHLORIDE, SODIUM LACTATE, POTASSIUM CHLORIDE, AND CALCIUM CHLORIDE 1000 ML: .6; .31; .03; .02 INJECTION, SOLUTION INTRAVENOUS at 15:04

## 2025-06-24 RX ADMIN — FOLIC ACID 1 MG: 1 TABLET ORAL at 15:05

## 2025-06-24 RX ADMIN — THIAMINE HYDROCHLORIDE 100 MG: 100 INJECTION, SOLUTION INTRAMUSCULAR; INTRAVENOUS at 15:04

## 2025-06-24 SDOH — HEALTH STABILITY: MENTAL HEALTH: ACTIVE SUICIDAL IDEATION WITH SPECIFIC PLAN AND INTENT (PAST 1 MONTH): YES

## 2025-06-24 SDOH — HEALTH STABILITY: MENTAL HEALTH: SUICIDAL BEHAVIOR (LIFETIME): NO

## 2025-06-24 SDOH — HEALTH STABILITY: MENTAL HEALTH: IN THE PAST FEW WEEKS, HAVE YOU FELT THAT YOU OR YOUR FAMILY WOULD BE BETTER OFF IF YOU WERE DEAD?: YES

## 2025-06-24 SDOH — HEALTH STABILITY: MENTAL HEALTH: ANXIETY SYMPTOMS: GENERALIZED;CHEST PAIN;FEELINGS OF DOOM

## 2025-06-24 SDOH — HEALTH STABILITY: MENTAL HEALTH: ARE YOU HAVING THOUGHTS OF KILLING YOURSELF RIGHT NOW?: NO

## 2025-06-24 SDOH — HEALTH STABILITY: MENTAL HEALTH: NON-SPECIFIC ACTIVE SUICIDAL THOUGHTS (PAST 1 MONTH): YES

## 2025-06-24 SDOH — HEALTH STABILITY: MENTAL HEALTH: WISH TO BE DEAD (PAST 1 MONTH): YES

## 2025-06-24 SDOH — HEALTH STABILITY: MENTAL HEALTH: ACTIVE SUICIDAL IDEATION WITH SOME INTENT TO ACT, WITHOUT SPECIFIC PLAN (PAST 1 MONTH): YES

## 2025-06-24 SDOH — HEALTH STABILITY: MENTAL HEALTH: IN THE PAST FEW WEEKS, HAVE YOU WISHED YOU WERE DEAD?: YES

## 2025-06-24 SDOH — HEALTH STABILITY: MENTAL HEALTH: CONTENT: UNREMARKABLE

## 2025-06-24 SDOH — ECONOMIC STABILITY: HOUSING INSECURITY: FEELS SAFE LIVING IN HOME: YES

## 2025-06-24 SDOH — HEALTH STABILITY: MENTAL HEALTH: DELUSIONS: OTHER (COMMENT)

## 2025-06-24 SDOH — HEALTH STABILITY: MENTAL HEALTH

## 2025-06-24 SDOH — HEALTH STABILITY: MENTAL HEALTH
DEPRESSION SYMPTOMS: CRYING;FEELINGS OF HELPLESSNESS;FEELINGS OF HOPELESSESS;FEELINGS OF WORTHLESSNESS;INCREASED IRRITABILITY;SLEEP DISTURBANCE;LOSS OF INTEREST;ISOLATIVE;CHANGE IN ENERGY LEVEL

## 2025-06-24 SDOH — ECONOMIC STABILITY: GENERAL

## 2025-06-24 SDOH — HEALTH STABILITY: MENTAL HEALTH: HAVE YOU EVER TRIED TO KILL YOURSELF?: NO

## 2025-06-24 SDOH — HEALTH STABILITY: MENTAL HEALTH: BEHAVIORAL HEALTH(WDL): EXCEPTIONS TO WDL

## 2025-06-24 SDOH — HEALTH STABILITY: MENTAL HEALTH: BEHAVIORS/MOOD: CALM;SAD

## 2025-06-24 SDOH — HEALTH STABILITY: MENTAL HEALTH: IN THE PAST WEEK, HAVE YOU BEEN HAVING THOUGHTS ABOUT KILLING YOURSELF?: YES

## 2025-06-24 ASSESSMENT — LIFESTYLE VARIABLES
TOTAL SCORE: 0
EVER FELT BAD OR GUILTY ABOUT YOUR DRINKING: NO
EVER HAD A DRINK FIRST THING IN THE MORNING TO STEADY YOUR NERVES TO GET RID OF A HANGOVER: NO
SUBSTANCE_ABUSE_PAST_12_MONTHS: YES
PRESCIPTION_ABUSE_PAST_12_MONTHS: NO
HAVE YOU EVER FELT YOU SHOULD CUT DOWN ON YOUR DRINKING: NO
HAVE PEOPLE ANNOYED YOU BY CRITICIZING YOUR DRINKING: NO

## 2025-06-24 ASSESSMENT — PAIN SCALES - GENERAL
PAINLEVEL_OUTOF10: 0 - NO PAIN
PAINLEVEL_OUTOF10: 3

## 2025-06-24 ASSESSMENT — PAIN DESCRIPTION - DESCRIPTORS: DESCRIPTORS: ACHING

## 2025-06-24 ASSESSMENT — PAIN - FUNCTIONAL ASSESSMENT: PAIN_FUNCTIONAL_ASSESSMENT: 0-10

## 2025-06-24 NOTE — ED PROVIDER NOTES
HPI   Chief Complaint   Patient presents with    Addiction Problem    Psychiatric Evaluation       Patient is a 39-year-old female with PMHx of schizoaffective disorder bipolar type, PTSD, GERD, HLD, migraines, DVT on Eliquis, polysubstance abuse presenting today for suicide attempt. Patient states that she took approximately 8 or 9 pills of her home paliperidone approximately 3 hours prior to arrival.  She reports that she is suicidal.  Does note history of depression and prior suicide attempt in the past.  Denies homicidal ideation or hallucinations.  Patient does note that she recently got filled a prescription of 30-day supply of her paliperidone 7 days ago.  Denies any other medication overdose.  She notes that she was drinking alcohol and doing crack cocaine this a.m.  She does note that she fell but is unsure if she injured something.  She is currently complaining of mid abdomen pain, right-sided chest pain without shortness of breath.  She is also endorsing a headache, lightheadedness, nausea without emesis. She is Alert and oriented x 3. She denies any vision changes, neck pain, back pain, diarrhea, constipation, urinary symptoms, numbness, tingling, fevers, or chills.               Patient History   Medical History[1]  Surgical History[2]  Family History[3]  Social History[4]    Physical Exam   ED Triage Vitals [06/24/25 1345]   Temperature Heart Rate Respirations BP   36.3 °C (97.3 °F) 92 18 135/85      Pulse Ox Temp src Heart Rate Source Patient Position   99 % -- -- --      BP Location FiO2 (%)     -- --       Physical Exam  Vitals and nursing note reviewed.   Constitutional:       General: She is not in acute distress.     Appearance: She is not ill-appearing.   HENT:      Head: Normocephalic and atraumatic.      Right Ear: External ear normal.      Left Ear: External ear normal.      Nose: Nose normal.      Mouth/Throat:      Mouth: Mucous membranes are moist.   Eyes:      General: No scleral  icterus.     Extraocular Movements: Extraocular movements intact.      Conjunctiva/sclera: Conjunctivae normal.      Pupils: Pupils are equal, round, and reactive to light.   Neck:      Comments: No step-offs or deformities.   Cardiovascular:      Rate and Rhythm: Normal rate and regular rhythm.      Heart sounds: Normal heart sounds.   Pulmonary:      Effort: No accessory muscle usage or respiratory distress.      Breath sounds: Normal breath sounds. No wheezing, rhonchi or rales.   Abdominal:      General: Bowel sounds are normal. There is no distension.      Palpations: Abdomen is soft.      Tenderness: There is no abdominal tenderness. There is no right CVA tenderness, left CVA tenderness, guarding or rebound.   Musculoskeletal:         General: No swelling or deformity. Normal range of motion.      Cervical back: Normal range of motion and neck supple. No tenderness.      Right lower leg: No edema.      Left lower leg: No edema.      Comments: No tenderness to palpation to extremities. Compartments are soft. Full ROM intact. Neurovascularly intact throughout. No midline spinal tenderness. No step-offs or deformities.    Skin:     General: Skin is warm and dry.      Capillary Refill: Capillary refill takes less than 2 seconds.   Neurological:      General: No focal deficit present.      Mental Status: She is alert and oriented to person, place, and time.      GCS: GCS eye subscore is 4. GCS verbal subscore is 5. GCS motor subscore is 6.      Cranial Nerves: Cranial nerves 2-12 are intact. No cranial nerve deficit.      Sensory: No sensory deficit.      Motor: Motor function is intact. No weakness.   Psychiatric:         Mood and Affect: Affect is flat.         Speech: Speech normal.         Behavior: Behavior is cooperative.      Comments: Reports suicidal ideation. Denies homicidal ideation. Denies auditory or visual hallucinations.            ED Course & MDM   ED Course as of 06/24/25 2044   Tue Jun 24, 2025    1353 Poison control:   Drowsiness, anticholinergic sx, seizures, qt prolong, hypotension  Supportive care, benzos for seizures, repeat EKG, supplement electrolytes  Obs: admitting for 24 hours if drowsiness; no sx, not necessary. Peak 24 hours long acting drugs [ML]   1700 CT chest abdomen pelvis wo IV contrast  1. No acute findings within the chest, abdomen or pelvis.  2. Hepatomegaly and hepatic steatosis.   [ML]   1700 CT head wo IV contrast  No evidence of intracranial hemorrhage or displaced skull fracture.   [ML]   1701 CT cervical spine wo IV contrast  No evidence for an acute fracture or subluxation of the cervical  spine.   [ML]   1827 Repeat EKG was obtained interpreted independently by me normal sinus rhythm with a rate of 83 normal interval normal axis no ST or T wave segment changes concerning for ischemia [RH]   1837 Alert and oriented x3 on re-eval. Patient medically clear.  [ML]      ED Course User Index  [ML] Guillermina Dominguez PA-C  [RH] Abrahan Foy,          Diagnoses as of 06/24/25 2044   Antipsychotic overdose, intentional self-harm, initial encounter (Multi)   Suicide attempt (Multi)                 No data recorded     Rosio Coma Scale Score: 15 (06/24/25 1334 : Guillermina Tinoco, RN)                           Medical Decision Making  39-year-old female presenting today after suicide attempt.  Patient reports that she took 8 or 9 pills of her paliperidone in an attempt to commit suicide. Per chart review, patient did have a recent fill history of her paliperidone for 30 pills 6 days ago, so she could have possible taken upwards technically of 20 to 24 pills.  She denies any other medication abuse.  She does admit to alcohol and crack cocaine use this morning.  On initial presentation, blood pressure normal. She is not tachycardic, she does not appear toxic. The patient is afebrile with stable vital signs. The patient is in no respiratory distress, satting well on room air. She is alert and  oriented x 3, neurologically and neurovascularly intact. Patient is endorsing some mild nausea, abdominal pain and headache.  She is also complaining of right-sided chest pain without shortness of breath.  We did contact poison control and discussed patient with Poison Control. Poison Control note that signs/symptoms to monitor for given her ingestion include possible drowsiness, anticholinergic symptoms, seizures, QT prolongation, or hypotension.  Poison Control is recommending supportive care, repeat EKG and supplement electrolytes as seen fit.  Poison Control note that if she is appearing drowsy in any way, could observe the patient for upwards of 24 hours as the half-life of the paliperidone is 24 hours.  Per Poison Control, if the patient is not having any symptoms of toxicity to this medication, 24 hour observation is not necessary. In the ED, elopement precautions and suicide precautions were ordered. Patient was given IV pepcid to treat her symptoms. She was given IV thiamine and oral multivitamin and folic acid given her history of alcohol use. Labwork and imaging were ordered for further evaluation.    Urine drug screen was positive for cannabinoids, cocaine and PCP.  CK is elevated at 392 and patient was given IV fluids.  Pregnancy test negative.  Troponin was negative.  Acute tox panel was negative.  CMP with mild hyponatremia at 134.  No other electrolyte abnormalities, HANNAH, transaminitis.  CBC, magnesium, lipase are normal.  Considering patient's complaints of abdominal pain, and the fact that she does admit to falling while drinking alcohol today, we will get a CT of chest abdomen pelvis, CT head, C-spine.  She is also complaining of left leg pain after her fall though there are no obvious deformities on exam, so we will get x-rays of chest, left femur and left hip.    Pan scans all are negative for acute injury which is reassuring.  Xrays do not show any evidence of fracture or dislocation. EKG  "nonischemic, no QT prolongation.  After 5 hours of monitoring patient, repeat EKG was obtained showing no prolonged QT or acute changes.  Patient is still not drowsy. She remains alert and oriented x 3 and feeling okay with some mild nausea. She remains stable, neurologically intact. Patient is medically clear after 5 hours of observation.  EPAT was consulted for patient evaluation.     At the end my shift, patient's care has been signed out to oncoming ED resident pending EPAT evaluation and final recommendations. Patient is pending remainder of her ED course and final disposition.         Procedure  Procedures       Guillermina Dominguez PA-C  06/24/25 9263    This patient was seen by the advanced practice provider.  I have personally performed a substantive portion of the encounter.  I have seen and examined the patient; agree with the workup, evaluation, MDM, management and diagnosis.  The care plan has been discussed.      I personally saw the patient and made/approved the management plan and take responsibility for the patient management.    Patient was signed out to my colleague, Dr. Abrahan Foy at 4 PM pending labwork, imaging studies, EPAT evaluation and recommendations once medically clear, and pending the remainder of her ED course and final disposition.     Shannan Toro MD         [1] No past medical history on file.  [2] No past surgical history on file.  [3] No family history on file.  [4]   Social History  Tobacco Use    Smoking status: Every Day     Types: Cigarettes    Smokeless tobacco: Not on file   Vaping Use    Vaping status: Never Used   Substance Use Topics    Alcohol use: Yes     Comment: occasionally    Drug use: Yes     Types: \"Crack\" cocaine, Marijuana        Shannan Toro MD  06/25/25 1455    "

## 2025-06-25 VITALS
RESPIRATION RATE: 15 BRPM | HEIGHT: 69 IN | WEIGHT: 220 LBS | TEMPERATURE: 97.3 F | BODY MASS INDEX: 32.58 KG/M2 | SYSTOLIC BLOOD PRESSURE: 115 MMHG | OXYGEN SATURATION: 97 % | DIASTOLIC BLOOD PRESSURE: 72 MMHG | HEART RATE: 76 BPM

## 2025-06-25 LAB — HOLD SPECIMEN: NORMAL
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
## 2025-06-25 NOTE — SIGNIFICANT EVENT
Application for Emergency Admission      Ready for Transfer?  Is the patient medically cleared for transfer to inpatient psychiatry: Yes  Has the patient been accepted to an inpatient psychiatric hospital: Yes    Application for Emergency Admission  IN ACCORDANCE WITH [place]122[place]  The Chief Clinical Officer of: [facility] 6/25/2025 .5:13 AM    Reason for Hospitalization  The undersigned has reason to believe that: Helena Odonnell Is a mentally ill person subject to hospitalization by court order under division B Section 5122.01 of [place] Revised Code, i.e., this person:    1.Yes  Represents a substantial risk of physical harm to self as manifested by evidence of threats of, or attempts at, suicide or serious self-inflicted bodily harm    2.No Represents a substantial risk of physical harm to others as manifested by evidence of recent homicidal or other violent behavior, evidence of recent threats that place another in reasonable fear of violent behavior and serious physical harm, or other evidence of present dangerousness    3.Yes Represents a substantial and immediate risk of serious physical impairment or injury to self as manifested by  evidence that the person is unable to provide for and is not providing for the person's basic physical needs because of the person's mental illness and that appropriate provision for those needs cannot be made  immediately available in the community    4.Yes Would benefit from treatment in a hospital for his mental illness and is in need of such treatment as manifested by evidence of behavior that creates a grave and imminent risk to substantial rights of others or  himself.    5.Yes Would benefit from treatment as manifested by evidence of behavior that indicates all of the following:       (a) The person is unlikely to survive safely in the community without supervision, based on a clinical determination.       (b) The person has a history of lack of compliance  with treatment for mental illness and one of the following applies:      (i) At least twice within the thirty-six months prior to the filing of an affidavit seeking court-ordered treatment of the person under section 5122.111 of the Revised Code, the lack of compliance has been a significant factor in necessitating hospitalization in a hospital or receipt of services in a forensic or other mental health unit of a correctional facility, provided that the thirty-six-month period shall be extended by the length of any hospitalization or incarceration of the person that occurred within the thirty-six-month period.      (ii) Within the forty-eight months prior to the filing of an affidavit seeking court-ordered treatment of the person under section 5122.111 of the Revised Code, the lack of compliance resulted in one or more acts of serious violent behavior toward self or others or threats of, or attempts at, serious physical harm to self or others, provided that the forty-eight-month period shall be extended by the length of any hospitalization or incarceration of the person that occurred within the forty-eight-month period.      (c) The person, as a result of mental illness, is unlikely to voluntarily participate in necessary treatment.       (d) In view of the person's treatment history and current behavior, the person is in need of treatment in order to prevent a relapse or deterioration that would be likely to result in substantial risk of serious harm to the person or others.    (e) Represents a substantial risk of physical harm to self or others if allowed to remain at liberty pending examination.    Therefore, it is requested that said person be admitted to the above named facility.    STATEMENT OF BELIEF    Must be filled out by one of the following: a psychiatrist, licensed physician, licensed clinical psychologist, health or ,  or .  (Statement shall include the circumstances  under which the individual was taken into custody and the reason for the person's belief that hospitalization is necessary. The statement shall also include a reference to efforts made to secure the individual's property at his residence if he was taken into custody there. Every reasonable and appropriate effort should be made to take this person into custody in the least conspicuous manner possible.)    Patient was seen today due to concerns of intentional suicide attempt.  Has been struggling with homelessness and addiction.  Is medically cleared for psychiatric placement and would benefit from inpatient psychiatric stabilization    Mariah Lu MD 6/25/2025     _____________________________________________________________   Place of Employment: [facility]     STATEMENT OF OBSERVATION BY PSYCHIATRIST, LICENSED PHYSICIAN, OR LICENSED CLINICAL PSYCHOLOGIST, IF APPLICABLE    Place of Observation (e.g., [facility], general hospital, office, emergency facility)  (If applicable, please complete)    Mariah Lu MD 6/25/2025    _____________________________________________________________